# Patient Record
Sex: FEMALE | Race: WHITE | NOT HISPANIC OR LATINO | Employment: OTHER | URBAN - METROPOLITAN AREA
[De-identification: names, ages, dates, MRNs, and addresses within clinical notes are randomized per-mention and may not be internally consistent; named-entity substitution may affect disease eponyms.]

---

## 2017-03-20 PROBLEM — R79.89 ELEVATED TROPONIN: Status: ACTIVE | Noted: 2017-03-20

## 2017-03-20 PROBLEM — N39.0 UTI (URINARY TRACT INFECTION): Status: ACTIVE | Noted: 2017-03-20

## 2017-03-20 PROBLEM — G93.41 ACUTE METABOLIC ENCEPHALOPATHY: Status: ACTIVE | Noted: 2017-03-20

## 2017-03-20 PROBLEM — D72.829 LEUKOCYTOSIS: Status: ACTIVE | Noted: 2017-03-20

## 2017-03-21 PROBLEM — D72.829 LEUKOCYTOSIS: Status: RESOLVED | Noted: 2017-03-20 | Resolved: 2017-03-21

## 2017-03-21 PROBLEM — I50.32 CHRONIC DIASTOLIC HEART FAILURE: Status: ACTIVE | Noted: 2017-03-21

## 2017-03-21 PROBLEM — J81.1 PULMONARY EDEMA: Status: ACTIVE | Noted: 2017-03-21

## 2017-05-29 PROBLEM — N39.0 UTI (URINARY TRACT INFECTION): Status: RESOLVED | Noted: 2017-03-20 | Resolved: 2017-05-29

## 2017-05-29 PROBLEM — E87.6 HYPOKALEMIA: Status: ACTIVE | Noted: 2017-05-29

## 2017-05-29 PROBLEM — R79.89 ELEVATED TROPONIN: Status: RESOLVED | Noted: 2017-03-20 | Resolved: 2017-05-29

## 2017-05-29 PROBLEM — R41.82 ALTERED MENTAL STATUS: Status: ACTIVE | Noted: 2017-05-29

## 2017-05-29 PROBLEM — J81.1 PULMONARY EDEMA: Status: RESOLVED | Noted: 2017-03-21 | Resolved: 2017-05-29

## 2017-05-29 PROBLEM — R34 DECREASED URINE OUTPUT: Status: ACTIVE | Noted: 2017-05-29

## 2017-05-30 PROBLEM — N28.9 RENAL LESION: Status: ACTIVE | Noted: 2017-05-30

## 2017-05-31 ENCOUNTER — OUTPATIENT CASE MANAGEMENT (OUTPATIENT)
Dept: ADMINISTRATIVE | Facility: OTHER | Age: 77
End: 2017-05-31

## 2017-05-31 NOTE — PROGRESS NOTES
Please note that this patient was not enrolled in Outpatient Case Management at this time due to being transferred to a facility.     Please contact hospitals at Ext. [phone] with questions.    Thank you,      Merly Gautam, SSC

## 2017-11-07 ENCOUNTER — LAB VISIT (OUTPATIENT)
Dept: LAB | Facility: HOSPITAL | Age: 77
End: 2017-11-07
Attending: NURSE PRACTITIONER
Payer: MEDICARE

## 2017-11-07 ENCOUNTER — OFFICE VISIT (OUTPATIENT)
Dept: UROLOGY | Facility: CLINIC | Age: 77
End: 2017-11-07
Payer: MEDICARE

## 2017-11-07 VITALS — SYSTOLIC BLOOD PRESSURE: 142 MMHG | HEART RATE: 82 BPM | DIASTOLIC BLOOD PRESSURE: 80 MMHG

## 2017-11-07 DIAGNOSIS — R30.0 DYSURIA: ICD-10-CM

## 2017-11-07 DIAGNOSIS — N39.0 FREQUENT UTI: ICD-10-CM

## 2017-11-07 DIAGNOSIS — N39.0 FREQUENT UTI: Primary | ICD-10-CM

## 2017-11-07 PROCEDURE — 99204 OFFICE O/P NEW MOD 45 MIN: CPT | Mod: S$PBB,,, | Performed by: NURSE PRACTITIONER

## 2017-11-07 PROCEDURE — 87077 CULTURE AEROBIC IDENTIFY: CPT

## 2017-11-07 PROCEDURE — 87088 URINE BACTERIA CULTURE: CPT

## 2017-11-07 PROCEDURE — 99999 PR PBB SHADOW E&M-EST. PATIENT-LVL IV: CPT | Mod: PBBFAC,,, | Performed by: NURSE PRACTITIONER

## 2017-11-07 PROCEDURE — 87086 URINE CULTURE/COLONY COUNT: CPT

## 2017-11-07 PROCEDURE — 87186 SC STD MICRODIL/AGAR DIL: CPT

## 2017-11-07 PROCEDURE — 99214 OFFICE O/P EST MOD 30 MIN: CPT | Mod: PBBFAC,PN | Performed by: NURSE PRACTITIONER

## 2017-11-07 RX ORDER — PHENAZOPYRIDINE HYDROCHLORIDE 100 MG/1
100 TABLET, FILM COATED ORAL 3 TIMES DAILY PRN
Qty: 15 TABLET | Refills: 0 | Status: SHIPPED | OUTPATIENT
Start: 2017-11-07 | End: 2017-11-12

## 2017-11-07 NOTE — PROGRESS NOTES
Subjective:       Patient ID: Laura Negron is a 77 y.o. female.    Chief Complaint: Urinary Tract Infection    Patient from [facility]. Recent UTI. Started on Cipro 500 mg BID x7 days. UA (10/23/17) and urine culture (10/26/17) was performed at an external lab. Patient reports holding her urine a lot at the nursing home while waiting for assistance to the restroom. Patient wears depends for accidents. Patient has had 3 positive urine culture results in the past 8 months (2 different pathogens noted). After most recent treatment with antibiotic (cipro), patient is still having urinary symptoms after completing the course. Patient caregiver from [facility] is present during visit.       Urinary Tract Infection    This is a recurrent problem. The current episode started acute onset. The problem occurs every urination. The problem has been unchanged. The quality of the pain is described as burning. The pain is at a severity of 3/10. The pain is mild. There has been no fever. She is not sexually active. There is no history of pyelonephritis. Associated symptoms include frequency and urgency. Pertinent negatives include no behavior changes, chills, discharge, flank pain, hematuria, nausea, vomiting or bubble bath use. She has tried antibiotics (Cipro) for the symptoms. Her past medical history is significant for diabetes mellitus, hypertension and recurrent UTIs. There is no history of kidney stones.     Review of Systems   Constitutional: Negative for chills.   Respiratory: Negative.    Cardiovascular: Negative.    Gastrointestinal: Negative for nausea and vomiting.   Genitourinary: Positive for dysuria, frequency and urgency. Negative for difficulty urinating, flank pain and hematuria.       Objective:      Physical Exam   Constitutional: She is oriented to person, place, and time. She appears well-developed and well-nourished.   HENT:   Head: Normocephalic and atraumatic.   Eyes: EOM are  normal. Pupils are equal, round, and reactive to light.   Neck: Normal range of motion.   Cardiovascular: Normal rate.    Pulmonary/Chest: Effort normal. No respiratory distress.   Patient in home O2 2.5 liters NC.    Abdominal: Soft. She exhibits no distension. There is no tenderness.   Musculoskeletal: Normal range of motion.   Currently in wheelchair.   Neurological: She is alert and oriented to person, place, and time. Coordination normal.   Skin: Skin is warm and dry.   Psychiatric: She has a normal mood and affect. Her behavior is normal. Judgment and thought content normal.   Nursing note and vitals reviewed.      Assessment:       1. Frequent UTI    2. Dysuria        Plan:       Laura was seen today for urinary tract infection.    Diagnoses and all orders for this visit:    Frequent UTI  -     POCT URINE DIPSTICK WITHOUT MICROSCOPE  -     Urine culture; Future    Dysuria  -     POCT URINE DIPSTICK WITHOUT MICROSCOPE  -     Urine culture; Future  -     phenazopyridine (PYRIDIUM) 100 MG tablet; Take 1 tablet (100 mg total) by mouth 3 (three) times daily as needed for Pain.    Other orders  Urine recheck (U/A with C + S) today post-antibiotic regimen.     Follow-up if symptoms worsen or fail to improve.     Kathryn Suarez NP

## 2017-11-10 DIAGNOSIS — N39.0 URINARY TRACT INFECTION WITHOUT HEMATURIA, SITE UNSPECIFIED: Primary | ICD-10-CM

## 2017-11-10 LAB — BACTERIA UR CULT: NORMAL

## 2017-11-10 RX ORDER — NITROFURANTOIN 25; 75 MG/1; MG/1
100 CAPSULE ORAL 2 TIMES DAILY
Qty: 14 CAPSULE | Refills: 0 | Status: SHIPPED | OUTPATIENT
Start: 2017-11-10 | End: 2017-11-17

## 2017-11-10 NOTE — PROGRESS NOTES
Spoke with patient nurse (Ernestina) at [facility] regarding urine culture result and sensitivity report. Verbal order given for Macrobid 100 mg by mouth every 12 hours for 7 days; give with food.     Diagnoses and all orders for this visit:    Urinary tract infection without hematuria, site unspecified  -     nitrofurantoin, macrocrystal-monohydrate, (MACROBID) 100 MG capsule; Take 1 capsule (100 mg total) by mouth 2 (two) times daily.    Follow-up if symptoms worsen or fail to improve.     Kathryn Suarez, NP

## 2017-11-21 DIAGNOSIS — N28.89 RENAL MASS, LEFT: Primary | ICD-10-CM

## 2018-03-17 ENCOUNTER — HOSPITAL ENCOUNTER (INPATIENT)
Facility: HOSPITAL | Age: 78
LOS: 2 days | Discharge: NURSING, REHAB OR LONG-TERM CARE | DRG: 444 | End: 2018-03-19
Attending: HOSPITALIST | Admitting: HOSPITALIST
Payer: MEDICARE

## 2018-03-17 DIAGNOSIS — K81.0 ACUTE CHOLECYSTITIS: ICD-10-CM

## 2018-03-17 PROCEDURE — 11000001 HC ACUTE MED/SURG PRIVATE ROOM

## 2018-03-17 PROCEDURE — 25000003 PHARM REV CODE 250: Performed by: NURSE PRACTITIONER

## 2018-03-17 RX ORDER — SODIUM CHLORIDE 0.9 % (FLUSH) 0.9 %
5 SYRINGE (ML) INJECTION
Status: DISCONTINUED | OUTPATIENT
Start: 2018-03-17 | End: 2018-03-19 | Stop reason: HOSPADM

## 2018-03-17 RX ORDER — GLUCAGON 1 MG
1 KIT INJECTION
Status: DISCONTINUED | OUTPATIENT
Start: 2018-03-17 | End: 2018-03-19 | Stop reason: HOSPADM

## 2018-03-17 RX ORDER — ONDANSETRON 8 MG/1
8 TABLET, ORALLY DISINTEGRATING ORAL EVERY 8 HOURS PRN
Status: DISCONTINUED | OUTPATIENT
Start: 2018-03-17 | End: 2018-03-19 | Stop reason: HOSPADM

## 2018-03-17 RX ORDER — IBUPROFEN 200 MG
24 TABLET ORAL
Status: DISCONTINUED | OUTPATIENT
Start: 2018-03-17 | End: 2018-03-19 | Stop reason: HOSPADM

## 2018-03-17 RX ORDER — AMOXICILLIN 250 MG
1 CAPSULE ORAL 2 TIMES DAILY PRN
Status: DISCONTINUED | OUTPATIENT
Start: 2018-03-17 | End: 2018-03-19 | Stop reason: HOSPADM

## 2018-03-17 RX ORDER — ACETAMINOPHEN 325 MG/1
650 TABLET ORAL EVERY 4 HOURS PRN
Status: DISCONTINUED | OUTPATIENT
Start: 2018-03-17 | End: 2018-03-19 | Stop reason: HOSPADM

## 2018-03-17 RX ORDER — METRONIDAZOLE 500 MG/100ML
500 INJECTION, SOLUTION INTRAVENOUS
Status: DISCONTINUED | OUTPATIENT
Start: 2018-03-18 | End: 2018-03-19 | Stop reason: HOSPADM

## 2018-03-17 RX ORDER — IBUPROFEN 200 MG
16 TABLET ORAL
Status: DISCONTINUED | OUTPATIENT
Start: 2018-03-17 | End: 2018-03-19 | Stop reason: HOSPADM

## 2018-03-17 RX ORDER — IPRATROPIUM BROMIDE AND ALBUTEROL SULFATE 2.5; .5 MG/3ML; MG/3ML
3 SOLUTION RESPIRATORY (INHALATION) EVERY 4 HOURS PRN
Status: DISCONTINUED | OUTPATIENT
Start: 2018-03-17 | End: 2018-03-19 | Stop reason: HOSPADM

## 2018-03-17 RX ORDER — RAMELTEON 8 MG/1
8 TABLET ORAL NIGHTLY PRN
Status: DISCONTINUED | OUTPATIENT
Start: 2018-03-17 | End: 2018-03-19 | Stop reason: HOSPADM

## 2018-03-17 RX ORDER — POLYETHYLENE GLYCOL 3350 17 G/17G
17 POWDER, FOR SOLUTION ORAL DAILY
Status: DISCONTINUED | OUTPATIENT
Start: 2018-03-18 | End: 2018-03-19 | Stop reason: HOSPADM

## 2018-03-17 RX ORDER — PROCHLORPERAZINE EDISYLATE 5 MG/ML
5 INJECTION INTRAMUSCULAR; INTRAVENOUS EVERY 6 HOURS PRN
Status: DISCONTINUED | OUTPATIENT
Start: 2018-03-17 | End: 2018-03-19 | Stop reason: HOSPADM

## 2018-03-17 RX ORDER — SODIUM CHLORIDE 9 MG/ML
INJECTION, SOLUTION INTRAVENOUS CONTINUOUS
Status: ACTIVE | OUTPATIENT
Start: 2018-03-17 | End: 2018-03-18

## 2018-03-17 RX ORDER — INSULIN ASPART 100 [IU]/ML
0-5 INJECTION, SOLUTION INTRAVENOUS; SUBCUTANEOUS
Status: DISCONTINUED | OUTPATIENT
Start: 2018-03-17 | End: 2018-03-19 | Stop reason: HOSPADM

## 2018-03-17 RX ORDER — BISACODYL 10 MG
10 SUPPOSITORY, RECTAL RECTAL DAILY PRN
Status: DISCONTINUED | OUTPATIENT
Start: 2018-03-17 | End: 2018-03-19 | Stop reason: HOSPADM

## 2018-03-17 RX ADMIN — SODIUM CHLORIDE: 0.9 INJECTION, SOLUTION INTRAVENOUS at 10:03

## 2018-03-18 PROBLEM — N30.00 ACUTE CYSTITIS: Status: ACTIVE | Noted: 2018-03-18

## 2018-03-18 PROBLEM — Z99.81 ON HOME OXYGEN THERAPY: Status: ACTIVE | Noted: 2018-03-18

## 2018-03-18 PROBLEM — I50.32 CHRONIC DIASTOLIC HEART FAILURE: Chronic | Status: ACTIVE | Noted: 2017-03-21

## 2018-03-18 PROBLEM — Z99.81 ON HOME OXYGEN THERAPY: Chronic | Status: ACTIVE | Noted: 2018-03-18

## 2018-03-18 LAB
ALBUMIN SERPL BCP-MCNC: 2.4 G/DL
ALP SERPL-CCNC: 97 U/L
ALT SERPL W/O P-5'-P-CCNC: 10 U/L
ANION GAP SERPL CALC-SCNC: 11 MMOL/L
AST SERPL-CCNC: 19 U/L
BASOPHILS # BLD AUTO: 0.02 K/UL
BASOPHILS NFR BLD: 0.2 %
BILIRUB SERPL-MCNC: 0.5 MG/DL
BUN SERPL-MCNC: 33 MG/DL
CALCIUM SERPL-MCNC: 8.5 MG/DL
CHLORIDE SERPL-SCNC: 105 MMOL/L
CO2 SERPL-SCNC: 26 MMOL/L
CREAT SERPL-MCNC: 0.7 MG/DL
DIFFERENTIAL METHOD: ABNORMAL
EOSINOPHIL # BLD AUTO: 0.3 K/UL
EOSINOPHIL NFR BLD: 2.1 %
ERYTHROCYTE [DISTWIDTH] IN BLOOD BY AUTOMATED COUNT: 14.8 %
EST. GFR  (AFRICAN AMERICAN): >60 ML/MIN/1.73 M^2
EST. GFR  (NON AFRICAN AMERICAN): >60 ML/MIN/1.73 M^2
GLUCOSE SERPL-MCNC: 102 MG/DL
HCT VFR BLD AUTO: 35.7 %
HGB BLD-MCNC: 11 G/DL
LYMPHOCYTES # BLD AUTO: 0.6 K/UL
LYMPHOCYTES NFR BLD: 4.8 %
MAGNESIUM SERPL-MCNC: 2.1 MG/DL
MCH RBC QN AUTO: 28.1 PG
MCHC RBC AUTO-ENTMCNC: 30.8 G/DL
MCV RBC AUTO: 91 FL
MONOCYTES # BLD AUTO: 0.6 K/UL
MONOCYTES NFR BLD: 5.1 %
NEUTROPHILS # BLD AUTO: 11.1 K/UL
NEUTROPHILS NFR BLD: 87.8 %
PHOSPHATE SERPL-MCNC: 3 MG/DL
PLATELET # BLD AUTO: 186 K/UL
PMV BLD AUTO: 11.3 FL
POCT GLUCOSE: 113 MG/DL (ref 70–110)
POCT GLUCOSE: 88 MG/DL (ref 70–110)
POTASSIUM SERPL-SCNC: 4.1 MMOL/L
PROT SERPL-MCNC: 5.8 G/DL
RBC # BLD AUTO: 3.91 M/UL
SODIUM SERPL-SCNC: 142 MMOL/L
TSH SERPL DL<=0.005 MIU/L-ACNC: 2.07 UIU/ML
WBC # BLD AUTO: 12.62 K/UL

## 2018-03-18 PROCEDURE — 94640 AIRWAY INHALATION TREATMENT: CPT

## 2018-03-18 PROCEDURE — 84443 ASSAY THYROID STIM HORMONE: CPT

## 2018-03-18 PROCEDURE — 99900035 HC TECH TIME PER 15 MIN (STAT)

## 2018-03-18 PROCEDURE — 99222 1ST HOSP IP/OBS MODERATE 55: CPT | Mod: ,,, | Performed by: STUDENT IN AN ORGANIZED HEALTH CARE EDUCATION/TRAINING PROGRAM

## 2018-03-18 PROCEDURE — 11000001 HC ACUTE MED/SURG PRIVATE ROOM

## 2018-03-18 PROCEDURE — 36415 COLL VENOUS BLD VENIPUNCTURE: CPT

## 2018-03-18 PROCEDURE — 25000242 PHARM REV CODE 250 ALT 637 W/ HCPCS: Performed by: NURSE PRACTITIONER

## 2018-03-18 PROCEDURE — 94761 N-INVAS EAR/PLS OXIMETRY MLT: CPT

## 2018-03-18 PROCEDURE — 85025 COMPLETE CBC W/AUTO DIFF WBC: CPT

## 2018-03-18 PROCEDURE — 80053 COMPREHEN METABOLIC PANEL: CPT

## 2018-03-18 PROCEDURE — 83735 ASSAY OF MAGNESIUM: CPT

## 2018-03-18 PROCEDURE — S0030 INJECTION, METRONIDAZOLE: HCPCS | Performed by: NURSE PRACTITIONER

## 2018-03-18 PROCEDURE — 84100 ASSAY OF PHOSPHORUS: CPT

## 2018-03-18 PROCEDURE — 63600175 PHARM REV CODE 636 W HCPCS: Performed by: NURSE PRACTITIONER

## 2018-03-18 PROCEDURE — 25000003 PHARM REV CODE 250: Performed by: NURSE PRACTITIONER

## 2018-03-18 RX ORDER — ASPIRIN 81 MG/1
81 TABLET ORAL DAILY
Status: DISCONTINUED | OUTPATIENT
Start: 2018-03-18 | End: 2018-03-19 | Stop reason: HOSPADM

## 2018-03-18 RX ORDER — VALSARTAN 160 MG/1
160 TABLET ORAL DAILY
Status: DISCONTINUED | OUTPATIENT
Start: 2018-03-18 | End: 2018-03-19 | Stop reason: HOSPADM

## 2018-03-18 RX ORDER — MECLIZINE HYDROCHLORIDE 25 MG/1
25 TABLET ORAL 3 TIMES DAILY PRN
Status: DISCONTINUED | OUTPATIENT
Start: 2018-03-18 | End: 2018-03-19 | Stop reason: HOSPADM

## 2018-03-18 RX ORDER — TIOTROPIUM BROMIDE 18 UG/1
18 CAPSULE ORAL; RESPIRATORY (INHALATION) DAILY
Status: DISCONTINUED | OUTPATIENT
Start: 2018-03-18 | End: 2018-03-19 | Stop reason: HOSPADM

## 2018-03-18 RX ORDER — CETIRIZINE HYDROCHLORIDE 5 MG/1
10 TABLET ORAL DAILY
Status: DISCONTINUED | OUTPATIENT
Start: 2018-03-18 | End: 2018-03-19 | Stop reason: HOSPADM

## 2018-03-18 RX ORDER — ESCITALOPRAM OXALATE 20 MG/1
20 TABLET ORAL DAILY
Status: DISCONTINUED | OUTPATIENT
Start: 2018-03-18 | End: 2018-03-19 | Stop reason: HOSPADM

## 2018-03-18 RX ORDER — FLUTICASONE FUROATE AND VILANTEROL 100; 25 UG/1; UG/1
1 POWDER RESPIRATORY (INHALATION) DAILY
Status: DISCONTINUED | OUTPATIENT
Start: 2018-03-18 | End: 2018-03-19 | Stop reason: HOSPADM

## 2018-03-18 RX ORDER — METOPROLOL SUCCINATE 25 MG/1
25 TABLET, EXTENDED RELEASE ORAL NIGHTLY
Status: DISCONTINUED | OUTPATIENT
Start: 2018-03-18 | End: 2018-03-19 | Stop reason: HOSPADM

## 2018-03-18 RX ORDER — CLONAZEPAM 0.5 MG/1
0.5 TABLET ORAL 2 TIMES DAILY PRN
Status: DISCONTINUED | OUTPATIENT
Start: 2018-03-18 | End: 2018-03-19 | Stop reason: HOSPADM

## 2018-03-18 RX ORDER — DOCUSATE SODIUM 100 MG/1
100 CAPSULE, LIQUID FILLED ORAL 2 TIMES DAILY
Status: DISCONTINUED | OUTPATIENT
Start: 2018-03-18 | End: 2018-03-19 | Stop reason: HOSPADM

## 2018-03-18 RX ADMIN — CEFTRIAXONE SODIUM 1 G: 1 INJECTION, POWDER, FOR SOLUTION INTRAMUSCULAR; INTRAVENOUS at 05:03

## 2018-03-18 RX ADMIN — SODIUM CHLORIDE: 0.9 INJECTION, SOLUTION INTRAVENOUS at 01:03

## 2018-03-18 RX ADMIN — METRONIDAZOLE 500 MG: 500 INJECTION, SOLUTION INTRAVENOUS at 01:03

## 2018-03-18 RX ADMIN — FLUTICASONE FUROATE AND VILANTEROL TRIFENATATE 1 PUFF: 100; 25 POWDER RESPIRATORY (INHALATION) at 09:03

## 2018-03-18 RX ADMIN — METRONIDAZOLE 500 MG: 500 INJECTION, SOLUTION INTRAVENOUS at 04:03

## 2018-03-18 RX ADMIN — ESCITALOPRAM OXALATE 20 MG: 20 TABLET, FILM COATED ORAL at 09:03

## 2018-03-18 RX ADMIN — METOPROLOL SUCCINATE 25 MG: 25 TABLET, EXTENDED RELEASE ORAL at 08:03

## 2018-03-18 RX ADMIN — VALSARTAN 160 MG: 160 TABLET, FILM COATED ORAL at 09:03

## 2018-03-18 RX ADMIN — TIOTROPIUM BROMIDE 18 MCG: 18 CAPSULE ORAL; RESPIRATORY (INHALATION) at 09:03

## 2018-03-18 RX ADMIN — METRONIDAZOLE 500 MG: 500 INJECTION, SOLUTION INTRAVENOUS at 08:03

## 2018-03-18 RX ADMIN — CETIRIZINE HYDROCHLORIDE 10 MG: 5 TABLET, FILM COATED ORAL at 09:03

## 2018-03-18 NOTE — HPI
"Laura Negron is a 77-year-old female with a past medical history of anemia, anxiety, asthma, constipation, COPD on home oxygen 2.5 lpm, dementia, Type 2 Diabetes, hypertension, depression, and thrombocytopenia who presented to [facility] ED via EMS from [facility] for altered mental status. she has been vomiting once a day for the past 5 days and had some recent constipation with last bowel movement earlier this morning She was afebrile, non-toxic appearing, and hemodynamically stable. At the nursing home she had been "acting funny" since earlier this morning and her level of consciousness gradually deteriorated from there. Head CT is negative. He mental status improved while in the ED and she is now alert and oriented.  She is on day 2 or 3 of oral ciprofloxacin for a urinary tract infection. She had Mild rhonchi noted bilaterally on auscultation of lung fields. Chest X-Ray is concerning for concern for mild bilateral pulmonary interstitial and perihilar opacities, as well as mild bibasilar atelectasis.  ED workup revealed labs which are notable for mild increase to WBC (12.73), stable anemia, and an increased BUN of 42 (up from 19) and a normal creatinine. Lactic Acid is normal. Urinalysis does show urinary tract infection. Urine drug screen is negative. Her abdomen was tender to palpation in her right lower quadrant abdominal CT was done which showed  acute cholecystitis with a stone in the neck of the gallbladder without ductal dilation.  She has normal bilirubin, normal liver enzymes. Dr. Jay with surgery was consulted who covers here and at [facility].  He requested transfer to [facility] for IR consultation for cholecystostomy  drain. She is admitted to [facility] Medicine. She was treated with 1liter of normal saline, ceftriaxone and metronidazole on the ED.       "

## 2018-03-18 NOTE — CONSULTS
Patient ID: Laura Negron is a 77 y.o. female.    Chief Complaint: Altered Mental Status      HPI:  77F transferred here from [facility] were she was admitted from nursing home for decreased mental status, concern for sepsis, cholecystitis. She was found to be mostly unresponsive yesterday. Complained of a few days of RUQ pain as well as nausea, vomiting. She is alert now and reports having this pain on and off for about the past 3 months. She has been afebrile since admit here. WBC has normalized and she is currently without pain at all.        Review of Systems   Constitutional: Positive for fever.   HENT: Negative for trouble swallowing.    Respiratory: Negative for shortness of breath.    Cardiovascular: Negative for chest pain.   Gastrointestinal: Positive for abdominal pain and nausea. Negative for blood in stool.   Genitourinary: Positive for dysuria.   Musculoskeletal: Positive for gait problem.   Skin: Negative for rash and wound.   Allergic/Immunologic: Negative for immunocompromised state.   Neurological: Positive for weakness.   Hematological: Negative for adenopathy. Does not bruise/bleed easily.   Psychiatric/Behavioral: Negative for agitation.       Current Facility-Administered Medications   Medication Dose Route Frequency Provider Last Rate Last Dose    0.9%  NaCl infusion   Intravenous Continuous Winnie Sánchez NP 75 mL/hr at 03/17/18 2215      acetaminophen tablet 650 mg  650 mg Oral Q4H PRN Winnie Sánchez NP        albuterol-ipratropium 2.5mg-0.5mg/3mL nebulizer solution 3 mL  3 mL Nebulization Q4H PRN Winnie Sánchez NP        aspirin EC tablet 81 mg  81 mg Oral Daily Winnie Sánchez NP        bisacodyl suppository 10 mg  10 mg Rectal Daily PRN Winnie Sánchez NP        cefTRIAXone (ROCEPHIN) 1 g in dextrose 5 % 50 mL IVPB  1 g Intravenous Q24H Winnie Sánchez NP        cetirizine tablet 10 mg  10 mg Oral Daily Winnie Sánchez NP   10 mg at  03/18/18 0901    clonazePAM tablet 0.5 mg  0.5 mg Oral BID PRN Winnie Sánchez, NP        dextrose 50% injection 12.5 g  12.5 g Intravenous PRN Winnie Sánchez, NP        dextrose 50% injection 25 g  25 g Intravenous PRN Winnie Sánchez, NP        docusate sodium capsule 100 mg  100 mg Oral BID Winnie Sánchez, LETITIA        escitalopram oxalate tablet 20 mg  20 mg Oral Daily Winnie Sánchez, NP   20 mg at 03/18/18 0901    fluticasone-vilanterol 100-25 mcg/dose diskus inhaler 1 puff  1 puff Inhalation Daily Winnie Sánchez NP   1 puff at 03/18/18 0920    glucagon (human recombinant) injection 1 mg  1 mg Intramuscular PRN Winnie Sánchez, NP        glucose chewable tablet 16 g  16 g Oral PRN Winnie Sánchez, NP        glucose chewable tablet 24 g  24 g Oral PRN Winnie Sánchez, NP        insulin aspart U-100 pen 0-5 Units  0-5 Units Subcutaneous QID (AC + HS) PRN Winnie Sánchez, LETITIA        meclizine tablet 25 mg  25 mg Oral TID PRN Winnie Sánchez, LETITIA        metoprolol succinate (TOPROL-XL) 24 hr tablet 25 mg  25 mg Oral QHS Winnie Sánchez, LETITIA        metronidazole IVPB 500 mg  500 mg Intravenous Q8H Winnie Sánchez,  mL/hr at 03/18/18 0444 500 mg at 03/18/18 0444    ondansetron disintegrating tablet 8 mg  8 mg Oral Q8H PRN Winnie Sánchez, LETITIA        polyethylene glycol packet 17 g  17 g Oral Daily Winnie Sánchez, LETITIA        prochlorperazine injection Soln 5 mg  5 mg Intravenous Q6H PRN Winnie Sánchez, NP        ramelteon tablet 8 mg  8 mg Oral Nightly PRN Winnie Sánchez, LETITIA        senna-docusate 8.6-50 mg per tablet 1 tablet  1 tablet Oral BID PRN Winnie Sánchez, NP        sodium chloride 0.9% flush 5 mL  5 mL Intravenous PRN Winine Sánchez NP        tiotropium inhalation capsule 18 mcg  18 mcg Inhalation Daily Winnie Sánchez NP   18 mcg at 03/18/18 0917    valsartan tablet 160 mg  160 mg Oral Daily Winnie  JULIANA Sánchez NP   160 mg at 03/18/18 0901    white petrolatum-mineral oil (LUBIFRESH P.M.) ophthalmic ointment   Both Eyes Nightly SHABBIRN Winnie Sánchez NP           Review of patient's allergies indicates:  No Known Allergies    Past Medical History:   Diagnosis Date    Anemia     Anxiety     Asthma     Constipation     COPD (chronic obstructive pulmonary disease)     Dementia     Dependence on supplemental oxygen     Diabetes mellitus type 2 in obese     Difficulty in walking     Edema     Hypertension     Major depression, single episode     Risk for falls     Thrombocytopenia        No past surgical history on file.    No family history on file.    Social History     Social History    Marital status: Single     Spouse name: N/A    Number of children: N/A    Years of education: N/A     Occupational History    Not on file.     Social History Main Topics    Smoking status: Former Smoker     Packs/day: 1.00     Types: Cigarettes     Quit date: 7/5/2012    Smokeless tobacco: Not on file    Alcohol use No    Drug use: No    Sexual activity: No     Other Topics Concern    Not on file     Social History Narrative    No narrative on file       Vitals:    03/18/18 0952   BP:    Pulse: 87   Resp:    Temp:        Physical Exam   Constitutional: She is oriented to person, place, and time. She appears well-nourished. No distress.   HENT:   Head: Normocephalic and atraumatic.   Cardiovascular: Normal rate.    Pulmonary/Chest: Effort normal. No stridor.   Abdominal: Soft. She exhibits no mass. There is no tenderness. There is no rebound and no guarding.   Neurological: She is alert and oriented to person, place, and time.   Skin: Skin is warm. She is not diaphoretic. No erythema.     WBC 12  Hg 11  Plts 186  LE+ on UA  Tbili 0.6  CT shows gallstones, inflammation around gallbladder    Assessment & Plan:   77F with COPD, nursing home resident here for acute cholecystitis, resolving  Patient is not a  good candidate for cholecystectomy and is otherwise not consenting to surgery anyway. We discussed cholecystostomy tube placement but she is currently asymptomatic and a more conservative approach with abx only seems reasonable.

## 2018-03-18 NOTE — NURSING
Patient had not urinated for 6 hours. Bladder scan performed. 33mls shown. Pt then stated that she just started to pee. Patient cleaned and provided with incontinent brief.

## 2018-03-18 NOTE — PLAN OF CARE
Problem: Patient Care Overview  Goal: Plan of Care Review  Outcome: Ongoing (interventions implemented as appropriate)  Patient on oxygen with documented flow of 2lpm.  Will attempt to wean per O2 order protocol. Will continue to monitor.

## 2018-03-18 NOTE — PLAN OF CARE
Problem: Patient Care Overview  Goal: Plan of Care Review  Pt is confused at times but alert and oriented to person and place. Reorientation to time attempted. Pt continued to be confused about time. Pt started on IV fluids and IV antibiotics. NPO for expected IR placement for Cholecystotomy Drain. Telemetry and blood sugar monitored. Safety maintained. Lighting adjusted, bed alarm active, bed rails up x3. Pt encouraged to use call light to voice any needs. Will continue to monitor for any changes.

## 2018-03-18 NOTE — HOSPITAL COURSE
Pt receiving ceftriaxone and flagyl. Initial plan was for cholecystostomy drain; however, d/t the pt's comorbidities and refusal of surgery conservative treatment with metronidazole and ciprofloxacin was pursued. She was d/bruna with abx.

## 2018-03-18 NOTE — SUBJECTIVE & OBJECTIVE
Past Medical History:   Diagnosis Date    Anemia     Anxiety     Asthma     Constipation     COPD (chronic obstructive pulmonary disease)     Dementia     Dependence on supplemental oxygen     Diabetes mellitus type 2 in obese     Difficulty in walking     Edema     Hypertension     Major depression, single episode     Risk for falls     Thrombocytopenia        No past surgical history on file.    Review of patient's allergies indicates:  No Known Allergies    Current Facility-Administered Medications on File Prior to Encounter   Medication    cefTRIAXone injection 1 g    metronidazole IVPB 500 mg    [COMPLETED] sodium chloride 0.9% bolus 1,000 mL     Current Outpatient Prescriptions on File Prior to Encounter   Medication Sig    acetaminophen (TYLENOL) 500 MG tablet Take 500 mg by mouth every 4 (four) hours as needed for Pain.     albuterol-ipratropium 2.5mg-0.5mg/3mL (DUO-NEB) 0.5 mg-3 mg(2.5 mg base)/3 mL nebulizer solution Take 3 mLs by nebulization every 6 (six) hours as needed for Wheezing.    aspirin (ECOTRIN) 81 MG EC tablet Take 81 mg by mouth once daily.    ciprofloxacin HCl (CIPRO) 500 MG tablet Take 500 mg by mouth 2 (two) times daily.    clonazePAM (KLONOPIN) 0.5 MG tablet Take 1 tablet (0.5 mg total) by mouth 2 (two) times daily as needed (anxiety).    dextran 70-hypromellose (TEARS) ophthalmic solution Place 2 drops into both eyes 2 (two) times daily.    docusate sodium (COLACE) 100 MG capsule Take 1 capsule (100 mg total) by mouth 2 (two) times daily.    escitalopram oxalate (LEXAPRO) 20 MG tablet Take 20 mg by mouth once daily.    fluticasone-salmeterol 250-50 mcg/dose (ADVAIR) 250-50 mcg/dose diskus inhaler Inhale 1 puff into the lungs 2 (two) times daily.    furosemide (LASIX) 20 MG tablet Take 1 tablet (20 mg total) by mouth once daily.    insulin aspart (NOVOLOG) 100 unit/mL InPn pen Inject 0-5 Units into the skin before meals and at bedtime as needed (Hyperglycemia).     loratadine (CLARITIN) 10 mg tablet Take 10 mg by mouth once daily.    meclizine (ANTIVERT) 32 MG tablet Take 25 mg by mouth 3 (three) times daily as needed.    melatonin 3 mg TbSR Take 2 tablets by mouth every evening.    metoprolol succinate (TOPROL-XL) 25 MG 24 hr tablet Take 25 mg by mouth every evening.     polyethylene glycol (GLYCOLAX) 17 gram PwPk Take 17 g by mouth once daily at 6am.     potassium chloride (KLOR-CON) 20 mEq Pack Take 20 mEq by mouth once daily.    tiotropium (SPIRIVA) 18 mcg inhalation capsule Inhale 18 mcg into the lungs once daily.    valsartan (DIOVAN) 160 MG tablet Take 160 mg by mouth once daily.     Family History     None        Social History Main Topics    Smoking status: Former Smoker     Packs/day: 1.00     Types: Cigarettes     Quit date: 7/5/2012    Smokeless tobacco: Not on file    Alcohol use No    Drug use: No    Sexual activity: No     Review of Systems   Constitutional: Positive for fatigue and fever. Negative for chills and diaphoresis.   HENT: Negative for sore throat and trouble swallowing.    Eyes: Negative for photophobia and visual disturbance.   Respiratory: Positive for shortness of breath. Negative for cough and wheezing.    Cardiovascular: Negative for chest pain and palpitations.   Gastrointestinal: Positive for abdominal pain (Right Lower Quadrant), nausea and vomiting. Negative for constipation and diarrhea.   Endocrine: Negative for polydipsia and polyphagia.   Genitourinary: Negative for decreased urine volume, dysuria, hematuria and urgency.   Musculoskeletal: Negative for joint swelling, neck pain and neck stiffness.   Neurological: Positive for weakness. Negative for numbness and headaches.   Psychiatric/Behavioral: Negative for agitation, dysphoric mood and suicidal ideas.     Objective:     Vital Signs (Most Recent):    Vital Signs (24h Range):  Temp:  [96.7 °F (35.9 °C)] 96.7 °F (35.9 °C)  Pulse:  [78-89] 86  Resp:  [16-24] 19  SpO2:   [90 %-99 %] 98 %  BP: (100-115)/(51-57) 106/52        There is no height or weight on file to calculate BMI.    Physical Exam   Constitutional: She is oriented to person, place, and time. She appears well-developed and well-nourished. No distress. Nasal cannula in place.   HENT:   Head: Normocephalic and atraumatic.   Mouth/Throat: Oropharynx is clear and moist. No oropharyngeal exudate.   Eyes: Conjunctivae are normal. Pupils are equal, round, and reactive to light. No scleral icterus.   Neck: Neck supple.   Cardiovascular: Normal rate, regular rhythm, normal heart sounds and intact distal pulses.  Exam reveals no gallop and no friction rub.    No murmur heard.  Pulmonary/Chest: Effort normal and breath sounds normal. Tachypnea noted. No respiratory distress. She has no wheezes. She has no rales.   Abdominal: Soft. Bowel sounds are normal. She exhibits no distension. There is tenderness (Right Lower Quadrant). There is no rebound and no guarding.   Musculoskeletal: She exhibits no edema, tenderness or deformity.   Neurological: She is alert and oriented to person, place, and time. She exhibits normal muscle tone.   Skin: Skin is warm and dry. Capillary refill takes 2 to 3 seconds. No rash noted. She is not diaphoretic.   Psychiatric: She has a normal mood and affect. Her behavior is normal. Cognition and memory are impaired. She exhibits abnormal recent memory.   Nursing note and vitals reviewed.        CRANIAL NERVES     CN III, IV, VI   Pupils are equal, round, and reactive to light.       Significant Labs:   CBC:   Recent Labs  Lab 03/17/18  1450   WBC 12.73*   HGB 11.4*   HCT 36.2*        CMP:   Recent Labs  Lab 03/17/18  1450      K 4.0   CL 98   CO2 32*   *   BUN 42*   CREATININE 0.79   CALCIUM 8.2*   PROT 6.1   ALBUMIN 3.2*   BILITOT 0.6   ALKPHOS 83   AST 21   ALT 21   ANIONGAP 7*   EGFRNONAA >60.0     Cardiac Markers: No results for input(s): CKMB, MYOGLOBIN, BNP, TROPISTAT in the last  48 hours.  Coagulation:   Recent Labs  Lab 03/17/18  1450   INR 1.3*     Lactic Acid:   Recent Labs  Lab 03/17/18  1450   LACTATE 0.8     Troponin:   Recent Labs  Lab 03/17/18  1450   TROPONINI <0.012     Urine Studies:   Recent Labs  Lab 03/17/18  1441   COLORU Yellow   APPEARANCEUA Cloudy*   PHUR 6.0   SPECGRAV 1.020   PROTEINUA 1+*   GLUCUA Negative   KETONESU Negative   BILIRUBINUA Negative   OCCULTUA Trace*   NITRITE Negative   UROBILINOGEN Negative   LEUKOCYTESUR 2+*   RBCUA 5*   WBCUA >100*   BACTERIA Moderate*   SQUAMEPITHEL 20   HYALINECASTS 0       ECG 3/17/18: Normal sinus rhythm (HR 86), Possible Inferior infarct ,age undetermined, Anterior ST and T wave abnormality    Significant Imaging: I have reviewed all pertinent imaging results/findings within the past 24 hours.     Chest X-Ray 3/17/18 Mild bilateral pulmonary interstitial and perihilar opacities, as well as mild bibasilar atelectasis.    Abdominal CT 3/17/18:    Acute cholecystitis with a stone in the neck of the gallbladder without ductal dilation

## 2018-03-18 NOTE — PROGRESS NOTES
"[facility] Medicine  Progress Note    Patient Name: Laura Negron  MRN: [id number]  Patient Class: IP- Inpatient   Admission Date: 3/17/2018  Length of Stay: 1 days  Attending Physician: Vijay Spring MD  Primary Care Provider: Primary Doctor No        Subjective:     Principal Problem:Acute cholecystitis    HPI:  Laura Negron is a 77-year-old female with a past medical history of anemia, anxiety, asthma, constipation, COPD on home oxygen 2.5 lpm, dementia, Type 2 Diabetes, hypertension, depression, and thrombocytopenia who presented to [facility] ED via EMS from [facility] for altered mental status. she has been vomiting once a day for the past 5 days and had some recent constipation with last bowel movement earlier this morning She was afebrile, non-toxic appearing, and hemodynamically stable. At the nursing home she had been "acting funny" since earlier this morning and her level of consciousness gradually deteriorated from there. Head CT is negative. He mental status improved while in the ED and she is now alert and oriented.  She is on day 2 or 3 of oral ciprofloxacin for a urinary tract infection. She had Mild rhonchi noted bilaterally on auscultation of lung fields. Chest X-Ray is concerning for concern for mild bilateral pulmonary interstitial and perihilar opacities, as well as mild bibasilar atelectasis.  ED workup revealed labs which are notable for mild increase to WBC (12.73), stable anemia, and an increased BUN of 42 (up from 19) and a normal creatinine. Lactic Acid is normal. Urinalysis does show urinary tract infection. Urine drug screen is negative. Her abdomen was tender to palpation in her right lower quadrant abdominal CT was done which showed  acute cholecystitis with a stone in the neck of the gallbladder without ductal dilation.  She has normal bilirubin, normal liver enzymes. Dr. Jay with surgery was consulted who " covers here and at [facility].  He requested transfer to [facility] for IR consultation for cholecystostomy  drain. She is admitted to [facility] Medicine. She was treated with 1liter of normal saline, ceftriaxone and metronidazole on the ED.         Hospital Course:  Pt receiving ceftriaxone and flagyl. To be seen by IR for drain placement.    Interval History: Pt resting in bed. She is feeling well. She is oriented to person, place, and time but does not seem to know why she is in the hospital. She has no pain or SOB today. Would like something to eat. Explained plan of care.    Review of Systems   Respiratory: Positive for wheezing. Negative for cough and shortness of breath.    Cardiovascular: Negative for chest pain, palpitations and leg swelling.   Gastrointestinal: Negative for abdominal pain, diarrhea and nausea.   Genitourinary: Positive for dysuria.     Objective:     Vital Signs (Most Recent):  Temp: 98.2 °F (36.8 °C) (03/18/18 0805)  Pulse: 87 (03/18/18 0952)  Resp: 18 (03/18/18 0920)  BP: (!) 122/58 (03/18/18 0805)  SpO2: 95 % (03/18/18 0920) Vital Signs (24h Range):  Temp:  [96.7 °F (35.9 °C)-98.2 °F (36.8 °C)] 98.2 °F (36.8 °C)  Pulse:  [78-99] 87  Resp:  [16-19] 18  SpO2:  [90 %-99 %] 95 %  BP: (103-122)/(51-58) 122/58     Weight: 98.1 kg (216 lb 4.3 oz)  Body mass index is 34.91 kg/m².    Intake/Output Summary (Last 24 hours) at 03/18/18 0958  Last data filed at 03/18/18 0600   Gross per 24 hour   Intake           681.25 ml   Output                0 ml   Net           681.25 ml      Physical Exam   Constitutional: She is oriented to person, place, and time.   Cardiovascular: Normal rate and regular rhythm.    Pulmonary/Chest: Effort normal. She has wheezes.   Abdominal: Soft. Bowel sounds are normal. There is tenderness (RUQ, mild).   Neurological: She is alert and oriented to person, place, and time.   Rambling and somewhat confused   Psychiatric: She has a normal mood  and affect. Her behavior is normal.       Significant Labs:   CBC:   Recent Labs  Lab 03/17/18  1450 03/18/18  0507   WBC 12.73* 12.62   HGB 11.4* 11.0*   HCT 36.2* 35.7*    186     TSH:   Recent Labs  Lab 03/18/18  0507   TSH 2.074       Significant Imaging: no new    Assessment/Plan:      * Acute cholecystitis    Patient with several days of nausea, vomiting, and right lower quadrant tenderness on palpation and decreased mental status. CT shows cholecystitis with stone on gallbladder neck. No duct dilation. Normal bilirubin, normal liver enzymes, normal wbc, and normal lactic acid.  No complaints today, WBC 12.6, afebrile.  --Surgery, Dr. Castañeda consulted and requesting IR consult for Cholecystotomy Drain  --Ceftriaxone and metronidazole  --NPO  --Gentle IV fluids          Diabetes mellitus type 2 in obese    2/16/18 HgA1c 5.1. .  She is only on sliding scale insulin at nursing home, Check blood glucose AC and HS and use SSI. Diabetic diet, when cleared for Diet..              COPD (chronic obstructive pulmonary disease)    On Home Oxygen (2.5 LPM)  Rare wheezes on exam, no SOB  --Continue home oxygen  --Scheduled Breathing treatments as patient had dyspnea on exam  --Continue home fluticisone-salmeterol and loratadine 10 mg daily          Acute metabolic encephalopathy    Toxic.  Resolving. Avasys at bedside.          Urinary tract infection without hematuria    Urinalysis with WBC > 100, moderate bacterial, 2+ Leukocytes. She was being treated with Ciprofloxacin for a UTI at the nursing home.   Will be covered with Ceftriaxone. Follow Urine Culture        Chronic diastolic heart failure    Essential Hypertension  3/20/17 Echo shows EF 55% with diastolic dysfunction, Mild MVR & TVR, PA 41.64  Stable chest x-ray. Does not appear to be clinically volume overloaded. -122.   --Holding home lasix 20 mg daily. Monitor Closely  --Continue home aspirin 81 mg daily, valsartan 160 mg daily and  metoprolol XL 25 mg daily            Major depression, single episode    Anxiety  Calm and pleasant on exam.   Continue home escitalopram 20 mg daily and clonazepam 0.5 mg BID prn            VTE Risk Mitigation         Ordered     IP VTE LOW RISK PATIENT  Once      03/17/18 4482              Maria Dolores Monet PA-C  Department of Hospital Medicine   [facility]

## 2018-03-18 NOTE — SUBJECTIVE & OBJECTIVE
Interval History: Pt resting in bed. She is feeling well. She is oriented to person, place, and time but does not seem to know why she is in the hospital. She has no pain or SOB today. Would like something to eat. Explained plan of care.    Review of Systems   Respiratory: Positive for wheezing. Negative for cough and shortness of breath.    Cardiovascular: Negative for chest pain, palpitations and leg swelling.   Gastrointestinal: Negative for abdominal pain, diarrhea and nausea.   Genitourinary: Positive for dysuria.     Objective:     Vital Signs (Most Recent):  Temp: 98.2 °F (36.8 °C) (03/18/18 0805)  Pulse: 87 (03/18/18 0952)  Resp: 18 (03/18/18 0920)  BP: (!) 122/58 (03/18/18 0805)  SpO2: 95 % (03/18/18 0920) Vital Signs (24h Range):  Temp:  [96.7 °F (35.9 °C)-98.2 °F (36.8 °C)] 98.2 °F (36.8 °C)  Pulse:  [78-99] 87  Resp:  [16-19] 18  SpO2:  [90 %-99 %] 95 %  BP: (103-122)/(51-58) 122/58     Weight: 98.1 kg (216 lb 4.3 oz)  Body mass index is 34.91 kg/m².    Intake/Output Summary (Last 24 hours) at 03/18/18 0958  Last data filed at 03/18/18 0600   Gross per 24 hour   Intake           681.25 ml   Output                0 ml   Net           681.25 ml      Physical Exam   Constitutional: She is oriented to person, place, and time.   Cardiovascular: Normal rate and regular rhythm.    Pulmonary/Chest: Effort normal. She has wheezes.   Abdominal: Soft. Bowel sounds are normal. There is tenderness (RUQ, mild).   Neurological: She is alert and oriented to person, place, and time.   Rambling and somewhat confused   Psychiatric: She has a normal mood and affect. Her behavior is normal.       Significant Labs:   CBC:   Recent Labs  Lab 03/17/18  1450 03/18/18  0507   WBC 12.73* 12.62   HGB 11.4* 11.0*   HCT 36.2* 35.7*    186     TSH:   Recent Labs  Lab 03/18/18  0507   TSH 2.074       Significant Imaging: no new

## 2018-03-18 NOTE — H&P
"[facility]  History & Physical    Patient Name: Laura Negron  MRN: [id number]  Admission Date: 3/17/2018  Attending Physician: Vijay Spring MD   Primary Care Provider: Primary Doctor No         Patient information was obtained from patient, nursing home, past medical records and ER records.     Subjective:     Principal Problem:Acute cholecystitis    Chief Complaint:   Chief Complaint   Patient presents with    Altered Mental Status        HPI: Laura Negron is a 77-year-old female with a past medical history of anemia, anxiety, asthma, constipation, COPD on home oxygen 2.5 lpm, dementia, Type 2 Diabetes, hypertension, depression, and thrombocytopenia who presented to [facility] ED via EMS from [facility] for altered mental status. she has been vomiting once a day for the past 5 days and had some recent constipation with last bowel movement earlier this morning She was afebrile, non-toxic appearing, and hemodynamically stable. At the nursing home she had been "acting funny" since earlier this morning and her level of consciousness gradually deteriorated from there. Head CT is negative. He mental status improved while in the ED and she is now alert and oriented.  She is on day 2 or 3 of oral ciprofloxacin for a urinary tract infection. She had Mild rhonchi noted bilaterally on auscultation of lung fields. Chest X-Ray is concerning for concern for mild bilateral pulmonary interstitial and perihilar opacities, as well as mild bibasilar atelectasis.  ED workup revealed labs which are notable for mild increase to WBC (12.73), stable anemia, and an increased BUN of 42 (up from 19) and a normal creatinine. Lactic Acid is normal. Urinalysis does show urinary tract infection. Urine drug screen is negative. Her abdomen was tender to palpation in her right lower quadrant abdominal CT was done which showed  acute cholecystitis with a stone in the " neck of the gallbladder without ductal dilation.  She has normal bilirubin, normal liver enzymes. Dr. Jay with surgery was consulted who covers here and at [facility].  He requested transfer to [facility] for IR consultation for cholecystostomy  drain. She is admitted to [facility] Medicine. She was treated with 1liter of normal saline, ceftriaxone and metronidazole on the ED.         Past Medical History:   Diagnosis Date    Anemia     Anxiety     Asthma     Constipation     COPD (chronic obstructive pulmonary disease)     Dementia     Dependence on supplemental oxygen     Diabetes mellitus type 2 in obese     Difficulty in walking     Edema     Hypertension     Major depression, single episode     Risk for falls     Thrombocytopenia        No past surgical history on file.    Review of patient's allergies indicates:  No Known Allergies    Current Facility-Administered Medications on File Prior to Encounter   Medication    cefTRIAXone injection 1 g    metronidazole IVPB 500 mg    [COMPLETED] sodium chloride 0.9% bolus 1,000 mL     Current Outpatient Prescriptions on File Prior to Encounter   Medication Sig    acetaminophen (TYLENOL) 500 MG tablet Take 500 mg by mouth every 4 (four) hours as needed for Pain.     albuterol-ipratropium 2.5mg-0.5mg/3mL (DUO-NEB) 0.5 mg-3 mg(2.5 mg base)/3 mL nebulizer solution Take 3 mLs by nebulization every 6 (six) hours as needed for Wheezing.    aspirin (ECOTRIN) 81 MG EC tablet Take 81 mg by mouth once daily.    ciprofloxacin HCl (CIPRO) 500 MG tablet Take 500 mg by mouth 2 (two) times daily.    clonazePAM (KLONOPIN) 0.5 MG tablet Take 1 tablet (0.5 mg total) by mouth 2 (two) times daily as needed (anxiety).    dextran 70-hypromellose (TEARS) ophthalmic solution Place 2 drops into both eyes 2 (two) times daily.    docusate sodium (COLACE) 100 MG capsule Take 1 capsule (100 mg total) by mouth 2 (two) times daily.     escitalopram oxalate (LEXAPRO) 20 MG tablet Take 20 mg by mouth once daily.    fluticasone-salmeterol 250-50 mcg/dose (ADVAIR) 250-50 mcg/dose diskus inhaler Inhale 1 puff into the lungs 2 (two) times daily.    furosemide (LASIX) 20 MG tablet Take 1 tablet (20 mg total) by mouth once daily.    insulin aspart (NOVOLOG) 100 unit/mL InPn pen Inject 0-5 Units into the skin before meals and at bedtime as needed (Hyperglycemia).    loratadine (CLARITIN) 10 mg tablet Take 10 mg by mouth once daily.    meclizine (ANTIVERT) 32 MG tablet Take 25 mg by mouth 3 (three) times daily as needed.    melatonin 3 mg TbSR Take 2 tablets by mouth every evening.    metoprolol succinate (TOPROL-XL) 25 MG 24 hr tablet Take 25 mg by mouth every evening.     polyethylene glycol (GLYCOLAX) 17 gram PwPk Take 17 g by mouth once daily at 6am.     potassium chloride (KLOR-CON) 20 mEq Pack Take 20 mEq by mouth once daily.    tiotropium (SPIRIVA) 18 mcg inhalation capsule Inhale 18 mcg into the lungs once daily.    valsartan (DIOVAN) 160 MG tablet Take 160 mg by mouth once daily.     Family History     None        Social History Main Topics    Smoking status: Former Smoker     Packs/day: 1.00     Types: Cigarettes     Quit date: 7/5/2012    Smokeless tobacco: Not on file    Alcohol use No    Drug use: No    Sexual activity: No     Review of Systems   Constitutional: Positive for fatigue and fever. Negative for chills and diaphoresis.   HENT: Negative for sore throat and trouble swallowing.    Eyes: Negative for photophobia and visual disturbance.   Respiratory: Positive for shortness of breath. Negative for cough and wheezing.    Cardiovascular: Negative for chest pain and palpitations.   Gastrointestinal: Positive for abdominal pain (Right Lower Quadrant), nausea and vomiting. Negative for constipation and diarrhea.   Endocrine: Negative for polydipsia and polyphagia.   Genitourinary: Negative for decreased urine volume, dysuria,  hematuria and urgency.   Musculoskeletal: Negative for joint swelling, neck pain and neck stiffness.   Neurological: Positive for weakness. Negative for numbness and headaches.   Psychiatric/Behavioral: Negative for agitation, dysphoric mood and suicidal ideas.     Objective:     Vital Signs (Most Recent):    Vital Signs (24h Range):  Temp:  [96.7 °F (35.9 °C)] 96.7 °F (35.9 °C)  Pulse:  [78-89] 86  Resp:  [16-24] 19  SpO2:  [90 %-99 %] 98 %  BP: (100-115)/(51-57) 106/52        There is no height or weight on file to calculate BMI.    Physical Exam   Constitutional: She is oriented to person, place, and time. She appears well-developed and well-nourished. No distress. Nasal cannula in place.   HENT:   Head: Normocephalic and atraumatic.   Mouth/Throat: Oropharynx is clear and moist. No oropharyngeal exudate.   Eyes: Conjunctivae are normal. Pupils are equal, round, and reactive to light. No scleral icterus.   Neck: Neck supple.   Cardiovascular: Normal rate, regular rhythm, normal heart sounds and intact distal pulses.  Exam reveals no gallop and no friction rub.    No murmur heard.  Pulmonary/Chest: Effort normal and breath sounds normal. Tachypnea noted. No respiratory distress. She has no wheezes. She has no rales.   Abdominal: Soft. Bowel sounds are normal. She exhibits no distension. There is tenderness (Right Lower Quadrant). There is no rebound and no guarding.   Musculoskeletal: She exhibits no edema, tenderness or deformity.   Neurological: She is alert and oriented to person, place, and time. She exhibits normal muscle tone.   Skin: Skin is warm and dry. Capillary refill takes 2 to 3 seconds. No rash noted. She is not diaphoretic.   Psychiatric: She has a normal mood and affect. Her behavior is normal. Cognition and memory are impaired. She exhibits abnormal recent memory.   Nursing note and vitals reviewed.        CRANIAL NERVES     CN III, IV, VI   Pupils are equal, round, and reactive to light.        Significant Labs:   CBC:   Recent Labs  Lab 03/17/18  1450   WBC 12.73*   HGB 11.4*   HCT 36.2*        CMP:   Recent Labs  Lab 03/17/18  1450      K 4.0   CL 98   CO2 32*   *   BUN 42*   CREATININE 0.79   CALCIUM 8.2*   PROT 6.1   ALBUMIN 3.2*   BILITOT 0.6   ALKPHOS 83   AST 21   ALT 21   ANIONGAP 7*   EGFRNONAA >60.0     Cardiac Markers: No results for input(s): CKMB, MYOGLOBIN, BNP, TROPISTAT in the last 48 hours.  Coagulation:   Recent Labs  Lab 03/17/18  1450   INR 1.3*     Lactic Acid:   Recent Labs  Lab 03/17/18  1450   LACTATE 0.8     Troponin:   Recent Labs  Lab 03/17/18  1450   TROPONINI <0.012     Urine Studies:   Recent Labs  Lab 03/17/18  1441   COLORU Yellow   APPEARANCEUA Cloudy*   PHUR 6.0   SPECGRAV 1.020   PROTEINUA 1+*   GLUCUA Negative   KETONESU Negative   BILIRUBINUA Negative   OCCULTUA Trace*   NITRITE Negative   UROBILINOGEN Negative   LEUKOCYTESUR 2+*   RBCUA 5*   WBCUA >100*   BACTERIA Moderate*   SQUAMEPITHEL 20   HYALINECASTS 0       ECG 3/17/18: Normal sinus rhythm (HR 86), Possible Inferior infarct ,age undetermined, Anterior ST and T wave abnormality    Significant Imaging: I have reviewed all pertinent imaging results/findings within the past 24 hours.     Chest X-Ray 3/17/18 Mild bilateral pulmonary interstitial and perihilar opacities, as well as mild bibasilar atelectasis.    Abdominal CT 3/17/18:    Acute cholecystitis with a stone in the neck of the gallbladder without ductal dilation      Assessment/Plan:     * Acute cholecystitis    Patient with several days of nausea, vomiting, and right lower quadrant tenderness on palpation and decreased mental status. CT shows cholecystitis with stone on gallbladder neck. No duct dilation. Normal bilirubin, normal liver enzymes, normal wbc, and normal lactic acid.    --Surgery, Dr. Castañeda consulted and requesting IR consult for Cholecystotomy Drain  --Ceftriaxone and metronidazole  --NPO  --Gentle IV fluids           Diabetes mellitus type 2 in obese    2/16/18 HgA1c 5.1  She is only on sliding scale insulin at nursing home, Check blood glucose AC and HS and use SSI. Diabetic diet, when cleared for Diet..              Major depression, single episode    Anxiety  Continue home escitalopram 20 mg daily and clonazepam 0.5 mg BID prn          COPD (chronic obstructive pulmonary disease)    On Home Oxygen (2.5 LPM)  --Continue home oxygen  --Scheduled Breathing treatments as patient has dyspnea on exam, no wheexing  --Continue home fluticisone-salmeterol and loratadine 10 mg daily          Chronic diastolic heart failure    Essential Hypertension  3/20/17 Echo shows EF 55% with diastolic dysfunction, Mild MVR & TVR, PA 41.64  Stable chest x-ray. Does not appear to be clinically volume overloaded.   --Holding home lasix 20 mg daily. Monitor Closely  --Continue home aspirin 81 mg daily, valsartan 160 mg daily and metoprolol XL 25 mg daily            Acute metabolic encephalopathy    Toxic.  Resolving. Sitter PRN          Urinary tract infection without hematuria    Urinalysis with WBC > 100, moderate bacterial, 2+ Leukocytes. She was being treated with Ciprofloxacin for a UTI at the nursing home. Will be covered with Ceftriaxone. Follow Urine Culture            VTE Risk Mitigation         Ordered     IP VTE LOW RISK PATIENT  Once      03/17/18 0974             Winnie Sánchez NP  Department of Hospital Medicine   [facility]

## 2018-03-19 VITALS
SYSTOLIC BLOOD PRESSURE: 119 MMHG | BODY MASS INDEX: 34.75 KG/M2 | DIASTOLIC BLOOD PRESSURE: 88 MMHG | WEIGHT: 216.25 LBS | HEIGHT: 66 IN | RESPIRATION RATE: 20 BRPM | HEART RATE: 97 BPM | TEMPERATURE: 98 F | OXYGEN SATURATION: 91 %

## 2018-03-19 PROBLEM — G93.41 ACUTE METABOLIC ENCEPHALOPATHY: Status: RESOLVED | Noted: 2017-03-20 | Resolved: 2018-03-19

## 2018-03-19 LAB
ALBUMIN SERPL BCP-MCNC: 2.3 G/DL
ALP SERPL-CCNC: 104 U/L
ALT SERPL W/O P-5'-P-CCNC: 8 U/L
ANION GAP SERPL CALC-SCNC: 8 MMOL/L
AST SERPL-CCNC: 15 U/L
BASOPHILS # BLD AUTO: 0.01 K/UL
BASOPHILS NFR BLD: 0.1 %
BILIRUB SERPL-MCNC: 0.5 MG/DL
BUN SERPL-MCNC: 22 MG/DL
CALCIUM SERPL-MCNC: 8.6 MG/DL
CHLORIDE SERPL-SCNC: 107 MMOL/L
CO2 SERPL-SCNC: 27 MMOL/L
CREAT SERPL-MCNC: 0.6 MG/DL
DIFFERENTIAL METHOD: ABNORMAL
EOSINOPHIL # BLD AUTO: 0.3 K/UL
EOSINOPHIL NFR BLD: 2.7 %
ERYTHROCYTE [DISTWIDTH] IN BLOOD BY AUTOMATED COUNT: 14.8 %
EST. GFR  (AFRICAN AMERICAN): >60 ML/MIN/1.73 M^2
EST. GFR  (NON AFRICAN AMERICAN): >60 ML/MIN/1.73 M^2
GLUCOSE SERPL-MCNC: 86 MG/DL
HCT VFR BLD AUTO: 37.2 %
HGB BLD-MCNC: 11.3 G/DL
LYMPHOCYTES # BLD AUTO: 0.9 K/UL
LYMPHOCYTES NFR BLD: 8.6 %
MAGNESIUM SERPL-MCNC: 2.2 MG/DL
MCH RBC QN AUTO: 27.9 PG
MCHC RBC AUTO-ENTMCNC: 30.4 G/DL
MCV RBC AUTO: 92 FL
MONOCYTES # BLD AUTO: 0.8 K/UL
MONOCYTES NFR BLD: 7.4 %
NEUTROPHILS # BLD AUTO: 8.3 K/UL
NEUTROPHILS NFR BLD: 80.9 %
PHOSPHATE SERPL-MCNC: 2.5 MG/DL
PLATELET # BLD AUTO: 183 K/UL
PMV BLD AUTO: 11.2 FL
POCT GLUCOSE: 115 MG/DL (ref 70–110)
POCT GLUCOSE: 89 MG/DL (ref 70–110)
POTASSIUM SERPL-SCNC: 3.7 MMOL/L
PROT SERPL-MCNC: 5.7 G/DL
RBC # BLD AUTO: 4.05 M/UL
SODIUM SERPL-SCNC: 142 MMOL/L
WBC # BLD AUTO: 10.2 K/UL

## 2018-03-19 PROCEDURE — 99232 SBSQ HOSP IP/OBS MODERATE 35: CPT | Mod: ,,, | Performed by: STUDENT IN AN ORGANIZED HEALTH CARE EDUCATION/TRAINING PROGRAM

## 2018-03-19 PROCEDURE — 94761 N-INVAS EAR/PLS OXIMETRY MLT: CPT

## 2018-03-19 PROCEDURE — 80053 COMPREHEN METABOLIC PANEL: CPT

## 2018-03-19 PROCEDURE — 83735 ASSAY OF MAGNESIUM: CPT

## 2018-03-19 PROCEDURE — S0030 INJECTION, METRONIDAZOLE: HCPCS | Performed by: NURSE PRACTITIONER

## 2018-03-19 PROCEDURE — 36415 COLL VENOUS BLD VENIPUNCTURE: CPT

## 2018-03-19 PROCEDURE — 84100 ASSAY OF PHOSPHORUS: CPT

## 2018-03-19 PROCEDURE — 27000221 HC OXYGEN, UP TO 24 HOURS

## 2018-03-19 PROCEDURE — 85025 COMPLETE CBC W/AUTO DIFF WBC: CPT

## 2018-03-19 PROCEDURE — 25000242 PHARM REV CODE 250 ALT 637 W/ HCPCS: Performed by: NURSE PRACTITIONER

## 2018-03-19 PROCEDURE — 25000003 PHARM REV CODE 250: Performed by: PHYSICIAN ASSISTANT

## 2018-03-19 PROCEDURE — 94640 AIRWAY INHALATION TREATMENT: CPT

## 2018-03-19 PROCEDURE — 25000003 PHARM REV CODE 250: Performed by: NURSE PRACTITIONER

## 2018-03-19 RX ORDER — CIPROFLOXACIN 500 MG/1
500 TABLET ORAL EVERY 12 HOURS
Status: DISCONTINUED | OUTPATIENT
Start: 2018-03-19 | End: 2018-03-19 | Stop reason: HOSPADM

## 2018-03-19 RX ORDER — METRONIDAZOLE 500 MG/1
500 TABLET ORAL 3 TIMES DAILY
Qty: 21 TABLET | Refills: 0 | Status: SHIPPED | OUTPATIENT
Start: 2018-03-19 | End: 2018-03-26

## 2018-03-19 RX ORDER — CIPROFLOXACIN 500 MG/1
500 TABLET ORAL EVERY 12 HOURS
Qty: 14 TABLET | Refills: 0 | Status: SHIPPED | OUTPATIENT
Start: 2018-03-19 | End: 2018-03-26

## 2018-03-19 RX ADMIN — VALSARTAN 160 MG: 160 TABLET, FILM COATED ORAL at 08:03

## 2018-03-19 RX ADMIN — METRONIDAZOLE 500 MG: 500 INJECTION, SOLUTION INTRAVENOUS at 11:03

## 2018-03-19 RX ADMIN — ESCITALOPRAM OXALATE 20 MG: 20 TABLET, FILM COATED ORAL at 08:03

## 2018-03-19 RX ADMIN — TIOTROPIUM BROMIDE 18 MCG: 18 CAPSULE ORAL; RESPIRATORY (INHALATION) at 08:03

## 2018-03-19 RX ADMIN — ASPIRIN 81 MG: 81 TABLET, COATED ORAL at 08:03

## 2018-03-19 RX ADMIN — CETIRIZINE HYDROCHLORIDE 10 MG: 5 TABLET, FILM COATED ORAL at 08:03

## 2018-03-19 RX ADMIN — METRONIDAZOLE 500 MG: 500 INJECTION, SOLUTION INTRAVENOUS at 04:03

## 2018-03-19 RX ADMIN — FLUTICASONE FUROATE AND VILANTEROL TRIFENATATE 1 PUFF: 100; 25 POWDER RESPIRATORY (INHALATION) at 08:03

## 2018-03-19 RX ADMIN — CIPROFLOXACIN 500 MG: 500 TABLET, FILM COATED ORAL at 11:03

## 2018-03-19 NOTE — PROGRESS NOTES
[facility]  General Surgery  Progress Note    Subjective:     Interval History:   Feeling well, rested well overnight  No NV. Tolerating diet  No pain at the moment    Post-Op Info:  * No surgery found *          Medications:  Continuous Infusions:  Scheduled Meds:   aspirin  81 mg Oral Daily    cefTRIAXone (ROCEPHIN) IVPB  1 g Intravenous Q24H    cetirizine  10 mg Oral Daily    ciprofloxacin HCl  500 mg Oral Q12H    docusate sodium  100 mg Oral BID    escitalopram oxalate  20 mg Oral Daily    fluticasone-vilanterol  1 puff Inhalation Daily    metoprolol succinate  25 mg Oral QHS    metronidazole  500 mg Intravenous Q8H    polyethylene glycol  17 g Oral Daily    tiotropium  18 mcg Inhalation Daily    valsartan  160 mg Oral Daily     PRN Meds:acetaminophen, albuterol-ipratropium 2.5mg-0.5mg/3mL, bisacodyl, clonazePAM, dextrose 50%, dextrose 50%, glucagon (human recombinant), glucose, glucose, insulin aspart U-100, meclizine, ondansetron, prochlorperazine, ramelteon, senna-docusate 8.6-50 mg, sodium chloride 0.9%, white petrolatum-mineral oil     Objective:     Vital Signs (Most Recent):  Temp: 98.1 °F (36.7 °C) (03/19/18 0747)  Pulse: 91 (03/19/18 0850)  Resp: 17 (03/19/18 0850)  BP: (!) 135/59 (03/19/18 0747)  SpO2: (!) 90 % (03/19/18 1124) Vital Signs (24h Range):  Temp:  [97.2 °F (36.2 °C)-99.2 °F (37.3 °C)] 98.1 °F (36.7 °C)  Pulse:  [] 91  Resp:  [17-20] 17  SpO2:  [90 %-95 %] 90 %  BP: (114-143)/(59-86) 135/59       Intake/Output Summary (Last 24 hours) at 03/19/18 1126  Last data filed at 03/19/18 0526   Gross per 24 hour   Intake          1051.25 ml   Output              400 ml   Net           651.25 ml       Physical Exam   Constitutional: No distress.   Cardiovascular: Normal rate.    Pulmonary/Chest: Effort normal. No respiratory distress.   Abdominal: Soft. She exhibits no mass.   Nontender even with deep palpation   Neurological: She is alert.   Skin: She is not  diaphoretic.       Significant Labs:  CBC:   Recent Labs  Lab 03/19/18  0509   WBC 10.20   RBC 4.05   HGB 11.3*   HCT 37.2      MCV 92   MCH 27.9   MCHC 30.4*     CMP:   Recent Labs  Lab 03/19/18  0509   GLU 86   CALCIUM 8.6*   ALBUMIN 2.3*   PROT 5.7*      K 3.7   CO2 27      BUN 22   CREATININE 0.6   ALKPHOS 104   ALT 8*   AST 15   BILITOT 0.5       Significant Diagnostics:  I have reviewed all pertinent imaging results/findings within the past 24 hours.    Assessment/Plan:     Active Diagnoses:    Diagnosis Date Noted POA    PRINCIPAL PROBLEM:  Acute cholecystitis [K81.0] 03/17/2018 Yes    On home oxygen therapy [Z99.81] 03/18/2018 Not Applicable     Chronic    COPD (chronic obstructive pulmonary disease) [J44.9]  Yes     Chronic    Diabetes mellitus type 2 in obese [E11.69, E66.9]  Yes    Major depression, single episode [F32.9]  Yes    Chronic diastolic heart failure [I50.32] 03/21/2017 Yes     Chronic    Acute metabolic encephalopathy [G93.41] 03/20/2017 Yes    Urinary tract infection without hematuria [N39.0] 03/20/2017 Yes    Anxiety [F41.9] 11/05/2016 Yes    Essential hypertension [I10] 11/05/2016 Yes     Chronic      Problems Resolved During this Admission:    Diagnosis Date Noted Date Resolved POA   77F with likely mild episode of acute cholecystitis  Poor operative candidate. Given how quickly she improved agree with just abx for now.  Likely discharge to nursing home today      Raudel Castañeda MD  General Surgery  [facility]

## 2018-03-19 NOTE — PROGRESS NOTES
Report given to nurse Hook at [facility]. Transport in place for  for 3 p.m. Patient informed at this time. PIV d/c and tele.

## 2018-03-19 NOTE — PROGRESS NOTES
[facility] will update admission coordinator Shay OG to follow up.

## 2018-03-19 NOTE — PLAN OF CARE
Chief Complaint   Patient presents with    Altered Mental Status       Per EMS, pt has had increased confusion this morning which has progressed to decreased level of consciousness. Pt arouses to loud verbal stimuli. Pt from [facility]. Pt shakes head yes and no and follows commands. Pt appears very drowsy     Pt resident at [facility]. Uses WC and sometimes a RW to get around. Pt requires minimal assist with ADLs and uses agency transportation       03/18/18 1615   Discharge Assessment   Assessment Type Discharge Planning Assessment   Confirmed/corrected address and phone number on facesheet? Yes   Assessment information obtained from? Patient   Expected Length of Stay (days) 2   Communicated expected length of stay with patient/caregiver yes   Prior to hospitilization cognitive status: Alert/Oriented   Prior to hospitalization functional status: Assistive Equipment   Current cognitive status: Alert/Oriented   Current Functional Status: Assistive Equipment;Needs Assistance   Facility Arrived From: [facility] resident   Lives With facility resident   Able to Return to Prior Arrangements yes   Is patient able to care for self after discharge? No   Who are your caregiver(s) and their phone number(s)? Daughter: Hallie Thomas [phone]   Patient's perception of discharge disposition nursing home   Readmission Within The Last 30 Days no previous admission in last 30 days   Patient currently being followed by outpatient case management? No   Patient currently receives any other outside agency services? No   Equipment Currently Used at Home walker, rolling;wheelchair  (uses the RW sometimes but mostly WC)   Do you have any problems affording any of your prescribed medications? No   Is the patient taking medications as prescribed? yes   Does the patient have transportation home? Yes   Dialysis Name and Scheduled days N/A   Does the patient receive services at the [facility]  Clinic? No   Discharge Plan A Return to nursing home   Patient/Family In Agreement With Plan yes     Christal Underwood RN Transitional Navigator  [phone]

## 2018-03-19 NOTE — PLAN OF CARE
Patient to d/c back to NH today. Orders reviewed and patient okay to admit back per Ruby @ [facility] admissions.   [place] wheelchair van will come  patient at 3pm .    transfer packet at bedside. Nurse Briana informed to Call report.     Daughter Lisa MIRZA Updated per patient of d/c plan. Daughters states she was not even aware patient was admitted to hospital.  Daughter updated per team and agrees with d/c back to NH today.       03/19/18 1401   Final Note   Assessment Type Final Discharge Note   Discharge Disposition Nurse   What phone number can be called within the next 1-3 days to see how you are doing after discharge? [phone]   Hospital Follow Up  Appt(s) scheduled? (per MEdical director)   Discharge plans and expectations educations in teach back method with documentation complete? Yes   Right Care Referral Info   Post Acute Recommendation Other

## 2018-03-19 NOTE — PLAN OF CARE
[facility]     Department of Hospital Medicine     [address] [place] [address]     [phone] after hours  [phone] fax       NURSING HOME ORDERS    03/19/2018    Admit to Nursing Home:  Regular Bed     Diagnoses:  Active Hospital Problems    Diagnosis  POA    *Acute cholecystitis [K81.0]  Yes     Priority: 1 - High    COPD (chronic obstructive pulmonary disease) [J44.9]  Yes     Priority: 2      Chronic    Diabetes mellitus type 2 in obese [E11.69, E66.9]  Yes     Priority: 2     Acute metabolic encephalopathy [G93.41]  Yes     Priority: 2     Urinary tract infection without hematuria [N39.0]  Yes     Priority: 2     Chronic diastolic heart failure [I50.32]  Yes     Priority: 3      Chronic    Major depression, single episode [F32.9]  Yes     Priority: 4     On home oxygen therapy [Z99.81]  Not Applicable     Chronic    Anxiety [F41.9]  Yes    Essential hypertension [I10]  Yes     Chronic      Resolved Hospital Problems    Diagnosis Date Resolved POA   No resolved problems to display.       Patient is homebound due to:  Acute cholecystitis    Allergies:Review of patient's allergies indicates:  No Known Allergies    Vitals: Per facility protocol    Diet: Cardiac diabetic    Acitivities: Activity as tolerated    LABS:  Per facility protocol    Nursing Precautions:   - Aspiration precautions:                  -  Upright 90 degrees befor during and after meals        - Fall precautions per nursing home protocol   - Decubitus precautions:        -  for positioning   - Pressure reducing foam mattress   - Turn patient every two hours. Use wedge pillows to anchor patient        MISCELLANEOUS CARE:      Routine Skin for Bedridden Patients:  Apply moisture barrier cream to all    skin folds and wet areas in perineal area daily and after baths and                           all bowel movements.  OXYGEN: 2L supplemental O2 PRN to keep sats  >88%           DIABETES CARE:       Check blood sugar:         Fingerstick blood sugar AC and HS        Report CBG < 60 or > 400 to physician.                                          Insulin Sliding Scale          Glucose  Novolog Insulin Subcutaneous        0 - 60   Orange juice or glucose tablet, hold insulin      No insulin   201-250  2 units   251-300  4 units   301-350  6 units   351-400  8 units   >400   10 units then call physician       Laura Negron   Home Medication Instructions SAVANNAH:[id number]    Printed on:03/19/18 1051   Medication Information                      acetaminophen (TYLENOL) 500 MG tablet  Take 500 mg by mouth every 4 (four) hours as needed for Pain.              albuterol-ipratropium 2.5mg-0.5mg/3mL (DUO-NEB) 0.5 mg-3 mg(2.5 mg base)/3 mL nebulizer solution  Take 3 mLs by nebulization every 6 (six) hours as needed for Wheezing.             aspirin (ECOTRIN) 81 MG EC tablet  Take 81 mg by mouth once daily.             ciprofloxacin HCl (CIPRO) 500 MG tablet  Take 1 tablet (500 mg total) by mouth every 12 (twelve) hours.  End date: 3/26/2018           clonazePAM (KLONOPIN) 0.5 MG tablet  Take 1 tablet (0.5 mg total) by mouth 2 (two) times daily as needed (anxiety).             dextran 70-hypromellose (TEARS) ophthalmic solution  Place 2 drops into both eyes 2 (two) times daily.             docusate sodium (COLACE) 100 MG capsule  Take 1 capsule (100 mg total) by mouth 2 (two) times daily.             escitalopram oxalate (LEXAPRO) 20 MG tablet  Take 20 mg by mouth once daily.             fluticasone-salmeterol 250-50 mcg/dose (ADVAIR) 250-50 mcg/dose diskus inhaler  Inhale 1 puff into the lungs 2 (two) times daily.             furosemide (LASIX) 20 MG tablet  Take 1 tablet (20 mg total) by mouth once daily.             insulin aspart (NOVOLOG) 100 unit/mL InPn pen  Inject 0-5 Units into the skin before meals and at bedtime as needed (Hyperglycemia).             loratadine  (CLARITIN) 10 mg tablet  Take 10 mg by mouth once daily.             meclizine (ANTIVERT) 32 MG tablet  Take 25 mg by mouth 3 (three) times daily as needed.             melatonin 3 mg TbSR  Take 2 tablets by mouth every evening.             metoprolol succinate (TOPROL-XL) 25 MG 24 hr tablet  Take 25 mg by mouth every evening.              metroNIDAZOLE (FLAGYL) 500 MG tablet  Take 1 tablet (500 mg total) by mouth 3 (three) times daily.  End date: 3/26/18           polyethylene glycol (GLYCOLAX) 17 gram PwPk  Take 17 g by mouth once daily at 6am.              potassium chloride (KLOR-CON) 20 mEq Pack  Take 20 mEq by mouth once daily.             tiotropium (SPIRIVA) 18 mcg inhalation capsule  Inhale 18 mcg into the lungs once daily.             valsartan (DIOVAN) 160 MG tablet  Take 160 mg by mouth once daily.                         _________________________________  Maria Dolores Monet PA-C  03/19/2018

## 2018-03-19 NOTE — DISCHARGE SUMMARY
"[facility] Hospital Medicine  Discharge Summary      Patient Name: Laura Negron  MRN: [id number]  Admission Date: 3/17/2018  Hospital Length of Stay: 2 days  Discharge Date and Time:  03/19/2018 1:14 PM  Attending Physician: Vijay Spring MD   Discharging Provider: Maria Dolores Monet PA-C  Primary Care Provider: Primary Doctor No      HPI:   Laura Negron is a 77-year-old female with a past medical history of anemia, anxiety, asthma, constipation, COPD on home oxygen 2.5 lpm, dementia, Type 2 Diabetes, hypertension, depression, and thrombocytopenia who presented to [facility] ED via EMS from [facility] for altered mental status. she has been vomiting once a day for the past 5 days and had some recent constipation with last bowel movement earlier this morning She was afebrile, non-toxic appearing, and hemodynamically stable. At the nursing home she had been "acting funny" since earlier this morning and her level of consciousness gradually deteriorated from there. Head CT is negative. He mental status improved while in the ED and she is now alert and oriented.  She is on day 2 or 3 of oral ciprofloxacin for a urinary tract infection. She had Mild rhonchi noted bilaterally on auscultation of lung fields. Chest X-Ray is concerning for concern for mild bilateral pulmonary interstitial and perihilar opacities, as well as mild bibasilar atelectasis.  ED workup revealed labs which are notable for mild increase to WBC (12.73), stable anemia, and an increased BUN of 42 (up from 19) and a normal creatinine. Lactic Acid is normal. Urinalysis does show urinary tract infection. Urine drug screen is negative. Her abdomen was tender to palpation in her right lower quadrant abdominal CT was done which showed  acute cholecystitis with a stone in the neck of the gallbladder without ductal dilation.  She has normal bilirubin, normal liver enzymes. Dr. Jay with surgery " was consulted who covers here and at [facility].  He requested transfer to [facility] for IR consultation for cholecystostomy  drain. She is admitted to [facility]. She was treated with 1liter of normal saline, ceftriaxone and metronidazole on the ED.         * No surgery found *      Hospital Course:   Pt receiving ceftriaxone and flagyl. Initial plan was for cholecystostomy drain; however, d/t the pt's comorbidities and refusal of surgery conservative treatment with metronidazole and ciprofloxacin was pursued. She was d/bruna with abx.      Consults:   Consults         Status Ordering Provider     General Surgery  Once     Provider:  Raudel Castañeda MD    Completed DANIELE VASQUEZ          * Acute cholecystitis    Patient with several days of nausea, vomiting, and right lower quadrant tenderness on palpation and decreased mental status. CT shows cholecystitis with stone on gallbladder neck. No duct dilation. Normal bilirubin, normal liver enzymes, normal wbc, and normal lactic acid.  No complaints today, WBC 12.6, afebrile.  Will pursue conservative treatment with ceftriaxone and metronidazole. Advance diet as tolerated.         Diabetes mellitus type 2 in obese    2/16/18 HgA1c 5.1. .  She is only on sliding scale insulin at nursing home, Check blood glucose AC and HS and use SSI. Diabetic diet.            COPD (chronic obstructive pulmonary disease)    On Home Oxygen (2.5 LPM)  No wheezing or SOB.   Duonebs PRN, continue home fluticasone salmeterol and loratadine            Urinary tract infection without hematuria    Urinalysis with WBC > 100, moderate bacterial, 2+ Leukocytes. She was being treated with Ciprofloxacin for a UTI at the nursing home.   Continue cipro at d/c. F/U cultures.         Acute metabolic encephalopathy-resolved as of 3/19/2018    Toxic.    Resolved.        Chronic diastolic heart failure    Essential Hypertension  3/20/17 Echo shows EF  55% with diastolic dysfunction, Mild MVR & TVR, PA 41.64  Stable chest x-ray. Does not appear to be clinically volume overloaded. -143.   Resume home lasix 20 mg daily. Monitor Closely  Continue home aspirin 81 mg daily, valsartan 160 mg daily and metoprolol XL 25 mg daily            Major depression, single episode    Anxiety  Calm and pleasant on exam.   Continue home escitalopram 20 mg daily and clonazepam 0.5 mg BID prn            Final Active Diagnoses:    Diagnosis Date Noted POA    PRINCIPAL PROBLEM:  Acute cholecystitis [K81.0] 03/17/2018 Yes    COPD (chronic obstructive pulmonary disease) [J44.9]  Yes     Chronic    Diabetes mellitus type 2 in obese [E11.69, E66.9]  Yes    Urinary tract infection without hematuria [N39.0] 03/20/2017 Yes    Chronic diastolic heart failure [I50.32] 03/21/2017 Yes     Chronic    Major depression, single episode [F32.9]  Yes    On home oxygen therapy [Z99.81] 03/18/2018 Not Applicable     Chronic    Anxiety [F41.9] 11/05/2016 Yes    Essential hypertension [I10] 11/05/2016 Yes     Chronic      Problems Resolved During this Admission:    Diagnosis Date Noted Date Resolved POA    Acute metabolic encephalopathy [G93.41] 03/20/2017 03/19/2018 Yes       Discharged Condition: stable    Disposition: Long Term Care    Follow Up:  Follow-up Information     [facility].    Specialties:  Nursing Home Agency, SNF Agency  Why:  Nursing Home  Contact information:  [place] [id number]  [phone]                 Patient Instructions:     Diet Cardiac     Vital signs per facility protocol     Intake and output per facility protocol     Skin assessment every shift      Notify Physician   Order Specific Question Answer Comments   Temperature (F) greater than 101    Systolic Blood Pressure SBP greater than or equal to 160    Systolic Blood Pressure SBP less than or equal to 90    Diastolic Blood Pressure DBP greater than or equal to 110    Diastolic  Blood Pressure DBP less than or equal to 60    Pulse greater than or equal to 120    Pulse less than or equal to 50    Respirations Rate RR greater than or equal to 30    Respirations Rate RR less than or equal to 6    Urine output less than 60 over 2 hours   SPO2% less than 90      Up in chair/ wheel chair     Activity as tolerated     Pulse Oximetry         Significant Diagnostic Studies: Labs:   CMP   Recent Labs  Lab 03/17/18  1450 03/18/18  0507 03/19/18  0509    142 142   K 4.0 4.1 3.7   CL 98 105 107   CO2 32* 26 27   * 102 86   BUN 42* 33* 22   CREATININE 0.79 0.7 0.6   CALCIUM 8.2* 8.5* 8.6*   PROT 6.1 5.8* 5.7*   ALBUMIN 3.2* 2.4* 2.3*   BILITOT 0.6 0.5 0.5   ALKPHOS 83 97 104   AST 21 19 15   ALT 21 10 8*   ANIONGAP 7* 11 8   ESTGFRAFRICA >60.0 >60 >60   EGFRNONAA >60.0 >60 >60    and CBC   Recent Labs  Lab 03/17/18  1450 03/18/18  0507 03/19/18  0509   WBC 12.73* 12.62 10.20   HGB 11.4* 11.0* 11.3*   HCT 36.2* 35.7* 37.2    186 183       Pending Diagnostic Studies:     None         Medications:  Reconciled Home Medications:   Current Discharge Medication List      START taking these medications    Details   metroNIDAZOLE (FLAGYL) 500 MG tablet Take 1 tablet (500 mg total) by mouth 3 (three) times daily.  Qty: 21 tablet, Refills: 0    Associated Diagnoses: Acute cholecystitis         CONTINUE these medications which have CHANGED    Details   ciprofloxacin HCl (CIPRO) 500 MG tablet Take 1 tablet (500 mg total) by mouth every 12 (twelve) hours.  Qty: 14 tablet, Refills: 0    Associated Diagnoses: Acute cholecystitis         CONTINUE these medications which have NOT CHANGED    Details   acetaminophen (TYLENOL) 500 MG tablet Take 500 mg by mouth every 4 (four) hours as needed for Pain.       aspirin (ECOTRIN) 81 MG EC tablet Take 81 mg by mouth once daily.      clonazePAM (KLONOPIN) 0.5 MG tablet Take 1 tablet (0.5 mg total) by mouth 2 (two) times daily as needed (anxiety).  Qty: 10  tablet, Refills: 0      dextran 70-hypromellose (TEARS) ophthalmic solution Place 2 drops into both eyes 2 (two) times daily.      docusate sodium (COLACE) 100 MG capsule Take 1 capsule (100 mg total) by mouth 2 (two) times daily.  Refills: 0      escitalopram oxalate (LEXAPRO) 20 MG tablet Take 20 mg by mouth once daily.      fluticasone-salmeterol 250-50 mcg/dose (ADVAIR) 250-50 mcg/dose diskus inhaler Inhale 1 puff into the lungs 2 (two) times daily.      furosemide (LASIX) 20 MG tablet Take 1 tablet (20 mg total) by mouth once daily.      insulin aspart (NOVOLOG) 100 unit/mL InPn pen Inject 0-5 Units into the skin before meals and at bedtime as needed (Hyperglycemia).  Refills: 0      loratadine (CLARITIN) 10 mg tablet Take 10 mg by mouth once daily.      meclizine (ANTIVERT) 32 MG tablet Take 25 mg by mouth 3 (three) times daily as needed.      melatonin 3 mg TbSR Take 2 tablets by mouth every evening.      metoprolol succinate (TOPROL-XL) 25 MG 24 hr tablet Take 25 mg by mouth every evening.       polyethylene glycol (GLYCOLAX) 17 gram PwPk Take 17 g by mouth once daily at 6am.       potassium chloride (KLOR-CON) 20 mEq Pack Take 20 mEq by mouth once daily.      tiotropium (SPIRIVA) 18 mcg inhalation capsule Inhale 18 mcg into the lungs once daily.      valsartan (DIOVAN) 160 MG tablet Take 160 mg by mouth once daily.      albuterol-ipratropium 2.5mg-0.5mg/3mL (DUO-NEB) 0.5 mg-3 mg(2.5 mg base)/3 mL nebulizer solution Take 3 mLs by nebulization every 6 (six) hours as needed for Wheezing.  Refills: 0             Indwelling Lines/Drains at time of discharge:   Lines/Drains/Airways          No matching active lines, drains, or airways          Time spent on the discharge of patient: 45 minutes  Patient was seen and examined on the date of discharge and determined to be suitable for discharge.         Maria Dolores Monet PA-C  Department of [facility] Medicine  [facility]

## 2018-03-19 NOTE — PLAN OF CARE
Problem: Patient Care Overview  Goal: Plan of Care Review  Outcome: Ongoing (interventions implemented as appropriate)  Pt's SpO2 95% on 2 lpm NC. Pt stated she wears 2 lpm NC at home. PRN aerosol tx not required. No adverse reactions to MDIs. No respiratory distress noted. Continue to monitor SpO2.

## 2018-04-17 PROBLEM — R07.9 CHEST PAIN: Status: ACTIVE | Noted: 2018-04-17

## 2018-04-18 PROBLEM — K81.0 ACUTE CHOLECYSTITIS: Status: RESOLVED | Noted: 2018-03-17 | Resolved: 2018-04-18

## 2018-04-18 PROBLEM — J44.1 COPD EXACERBATION: Status: ACTIVE | Noted: 2018-04-18

## 2018-04-18 PROBLEM — J18.9 PNA (PNEUMONIA): Status: ACTIVE | Noted: 2018-04-18

## 2018-04-18 PROBLEM — N39.0 URINARY TRACT INFECTION WITHOUT HEMATURIA: Status: RESOLVED | Noted: 2017-03-20 | Resolved: 2018-04-18

## 2018-04-18 PROBLEM — E87.6 HYPOKALEMIA: Status: RESOLVED | Noted: 2017-05-29 | Resolved: 2018-04-18

## 2018-04-18 PROBLEM — D69.6 THROMBOCYTOPENIA, UNSPECIFIED: Status: ACTIVE | Noted: 2018-04-18

## 2018-04-29 PROBLEM — D72.829 LEUKOCYTOSIS: Status: RESOLVED | Noted: 2017-03-20 | Resolved: 2018-04-29

## 2018-10-08 ENCOUNTER — OFFICE VISIT (OUTPATIENT)
Dept: UROLOGY | Facility: CLINIC | Age: 78
End: 2018-10-08
Payer: MEDICARE

## 2018-10-08 VITALS
RESPIRATION RATE: 18 BRPM | WEIGHT: 199 LBS | BODY MASS INDEX: 31.98 KG/M2 | DIASTOLIC BLOOD PRESSURE: 66 MMHG | SYSTOLIC BLOOD PRESSURE: 126 MMHG | HEART RATE: 74 BPM | HEIGHT: 66 IN | OXYGEN SATURATION: 97 %

## 2018-10-08 DIAGNOSIS — Z23 IMMUNIZATION DUE: ICD-10-CM

## 2018-10-08 DIAGNOSIS — R30.0 DYSURIA: Primary | ICD-10-CM

## 2018-10-08 PROCEDURE — G0009 ADMIN PNEUMOCOCCAL VACCINE: HCPCS | Mod: PBBFAC

## 2018-10-08 PROCEDURE — 99999 PR PBB SHADOW E&M-EST. PATIENT-LVL V: CPT | Mod: PBBFAC,,, | Performed by: NURSE PRACTITIONER

## 2018-10-08 PROCEDURE — 99212 OFFICE O/P EST SF 10 MIN: CPT | Mod: S$PBB,,, | Performed by: NURSE PRACTITIONER

## 2018-10-08 PROCEDURE — 90670 PCV13 VACCINE IM: CPT | Mod: PBBFAC,PO | Performed by: NURSE PRACTITIONER

## 2018-10-08 PROCEDURE — 99215 OFFICE O/P EST HI 40 MIN: CPT | Mod: PBBFAC,PO | Performed by: NURSE PRACTITIONER

## 2018-10-08 NOTE — PROGRESS NOTES
Subjective:       Patient ID: Laura Negron is a 78 y.o. female.    Chief Complaint: Urinary Tract Infection    Patient is a 79 yo WF who is here today for dysuria. She wears a depend for moments she's unable to get someone to assist her to the restroom. Patient resides at [facility] and is here today with a caregiver from nursing home. Patient tried to give a urine sample today in clinic, but missed the collection hat/container that was in the toilet. External orders (U/A and urine culture) given to patient and caregiver for nursing home collect.       Urinary Tract Infection    This is a chronic problem. Episode onset: approximately 1 year  The problem occurs intermittently. The problem has been waxing and waning. The quality of the pain is described as burning. The pain is at a severity of 10/10. The pain is severe. There has been no fever. She is not sexually active. There is no history of pyelonephritis. Associated symptoms include frequency, nausea and urgency. Pertinent negatives include no behavior changes, chills, discharge, flank pain, hematuria, hesitancy, possible pregnancy, sweats, vomiting, weight loss, bubble bath use, constipation, rash or withholding. She has tried nothing for the symptoms. Her past medical history is significant for diabetes mellitus and hypertension.     Review of Systems   Constitutional: Negative for appetite change, chills, fever and weight loss.   Respiratory: Positive for shortness of breath.    Gastrointestinal: Positive for nausea. Negative for abdominal pain, constipation, diarrhea and vomiting.   Genitourinary: Positive for dysuria, frequency and urgency. Negative for decreased urine volume, difficulty urinating, flank pain, hematuria, hesitancy, menstrual problem, vaginal bleeding, vaginal discharge and vaginal pain.   Skin: Negative for rash.   Neurological: Positive for dizziness and weakness. Negative for headaches.   Psychiatric/Behavioral: Negative.         Objective:      Physical Exam   Constitutional: She is oriented to person, place, and time. No distress.   Obese   HENT:   Head: Normocephalic and atraumatic.   Eyes: EOM are normal. Pupils are equal, round, and reactive to light.   Neck: Normal range of motion.   Cardiovascular: Normal rate.   Pulmonary/Chest: Effort normal. No respiratory distress.   Patient currently on 2 liters NC.    Abdominal: Soft. There is no tenderness.   Musculoskeletal: Normal range of motion. She exhibits no edema.   Patient currently in wheelchair.    Neurological: She is alert and oriented to person, place, and time. Coordination normal.   Skin: Skin is warm and dry.   Psychiatric: She has a normal mood and affect. Her behavior is normal. Judgment and thought content normal.   Nursing note and vitals reviewed.      Assessment:       1. Dysuria    2. Immunization due        Plan:       Laura was seen today for urinary tract infection.    Diagnoses and all orders for this visit:    Dysuria  -     Urinalysis; Future  -     Urine culture; Future    Immunization due  -     (In Office Administered) Pneumococcal Conjugate Vaccine (13 Valent) (IM)    Follow-up pending lab results.    Kathryn Suarez NP

## 2018-10-08 NOTE — PATIENT INSTRUCTIONS
U/A and urine culture ([facility] collect); fax results to clinic.   Prevnar 13 vaccine today per patient request.   Follow-up pending lab results.

## 2018-11-26 ENCOUNTER — OFFICE VISIT (OUTPATIENT)
Dept: UROLOGY | Facility: CLINIC | Age: 78
End: 2018-11-26
Payer: MEDICARE

## 2018-11-26 VITALS
WEIGHT: 199 LBS | DIASTOLIC BLOOD PRESSURE: 73 MMHG | HEART RATE: 85 BPM | BODY MASS INDEX: 31.98 KG/M2 | SYSTOLIC BLOOD PRESSURE: 118 MMHG | OXYGEN SATURATION: 98 % | HEIGHT: 66 IN | RESPIRATION RATE: 18 BRPM

## 2018-11-26 DIAGNOSIS — R32 URINARY INCONTINENCE, UNSPECIFIED TYPE: ICD-10-CM

## 2018-11-26 DIAGNOSIS — R30.0 DYSURIA: Primary | ICD-10-CM

## 2018-11-26 DIAGNOSIS — N39.0 RECURRENT UTI: ICD-10-CM

## 2018-11-26 LAB
BACTERIA #/AREA URNS AUTO: ABNORMAL /HPF
BILIRUB UR QL STRIP: NEGATIVE
CLARITY UR REFRACT.AUTO: ABNORMAL
COLOR UR AUTO: YELLOW
GLUCOSE UR QL STRIP: NEGATIVE
HGB UR QL STRIP: ABNORMAL
HYALINE CASTS UR QL AUTO: 0 /LPF
KETONES UR QL STRIP: NEGATIVE
LEUKOCYTE ESTERASE UR QL STRIP: ABNORMAL
MICROSCOPIC COMMENT: ABNORMAL
NITRITE UR QL STRIP: NEGATIVE
PH UR STRIP: 7 [PH] (ref 5–8)
PROT UR QL STRIP: ABNORMAL
RBC #/AREA URNS AUTO: 17 /HPF (ref 0–4)
SP GR UR STRIP: 1.01 (ref 1–1.03)
SQUAMOUS #/AREA URNS AUTO: >100 /HPF
URN SPEC COLLECT METH UR: ABNORMAL
WBC #/AREA URNS AUTO: >100 /HPF (ref 0–5)
WBC CLUMPS UR QL AUTO: ABNORMAL

## 2018-11-26 PROCEDURE — 87186 SC STD MICRODIL/AGAR DIL: CPT

## 2018-11-26 PROCEDURE — 81001 URINALYSIS AUTO W/SCOPE: CPT

## 2018-11-26 PROCEDURE — 99999 PR PBB SHADOW E&M-EST. PATIENT-LVL V: CPT | Mod: PBBFAC,,, | Performed by: NURSE PRACTITIONER

## 2018-11-26 PROCEDURE — 87077 CULTURE AEROBIC IDENTIFY: CPT

## 2018-11-26 PROCEDURE — 99215 OFFICE O/P EST HI 40 MIN: CPT | Mod: PBBFAC,PO | Performed by: NURSE PRACTITIONER

## 2018-11-26 PROCEDURE — 99214 OFFICE O/P EST MOD 30 MIN: CPT | Mod: S$PBB,,, | Performed by: NURSE PRACTITIONER

## 2018-11-26 PROCEDURE — 87086 URINE CULTURE/COLONY COUNT: CPT

## 2018-11-26 PROCEDURE — 87088 URINE BACTERIA CULTURE: CPT

## 2018-11-26 RX ORDER — PHENAZOPYRIDINE HYDROCHLORIDE 100 MG/1
100 TABLET, FILM COATED ORAL 3 TIMES DAILY PRN
Qty: 9 TABLET | Refills: 0 | Status: SHIPPED | OUTPATIENT
Start: 2018-11-26 | End: 2018-11-29

## 2018-11-26 NOTE — PROGRESS NOTES
Subjective:       Patient ID: Laura Negron is a 78 y.o. female.    Chief Complaint: Urinary Tract Infection (recurrent every 4 months / patient states she wears diapers and that may be the reason)    Patient is a 79 yo WF who is here today for dysuria and recurrent UTI's. She wears a depend for urinary incontinence. Patient resides at [facility] and is here today with a caregiver from nursing home.       Dysuria    This is a chronic problem. Episode onset: approximately 4 months. The problem has been waxing and waning. The quality of the pain is described as burning. The pain is at a severity of 6/10. The pain is moderate. There has been no fever. She is not sexually active. There is no history of pyelonephritis. Associated symptoms include hesitancy, nausea and urgency. Pertinent negatives include no behavior changes, chills, discharge, flank pain, frequency, hematuria, possible pregnancy, vomiting, weight loss, bubble bath use, constipation, rash or withholding. She has tried nothing for the symptoms. Her past medical history is significant for diabetes mellitus, hypertension and recurrent UTIs. There is no history of kidney stones, a single kidney or a urological procedure.     Review of Systems   Constitutional: Positive for fatigue. Negative for appetite change, chills, fever and weight loss.   Gastrointestinal: Positive for nausea. Negative for abdominal pain, constipation, diarrhea and vomiting.   Genitourinary: Positive for dysuria, hesitancy and urgency. Negative for decreased urine volume, difficulty urinating, flank pain, frequency, hematuria, pelvic pain, vaginal bleeding, vaginal discharge and vaginal pain. Enuresis: 4 months.        Urinary hesitancy  Urinary incontinence   Skin: Negative for rash.   Neurological: Positive for weakness.   Psychiatric/Behavioral: Negative.        Objective:      Physical Exam   Constitutional: She is oriented to person, place, and time. No distress.    HENT:   Head: Normocephalic and atraumatic.   Eyes: EOM are normal. Pupils are equal, round, and reactive to light.   Neck: Normal range of motion.   Cardiovascular: Normal rate.   Pulmonary/Chest: Effort normal. No respiratory distress.   Currently on oxygen 3 liters NC   Abdominal: Soft. There is no tenderness.   Musculoskeletal: Normal range of motion.   Patient currently in wheelchair.   Neurological: She is alert and oriented to person, place, and time.   Skin: Skin is warm and dry.   Psychiatric: She has a normal mood and affect. Her behavior is normal. Judgment and thought content normal.   Nursing note and vitals reviewed.      Assessment:       1. Dysuria    2. Recurrent UTI    3. Urinary incontinence, unspecified type        Plan:       Laura was seen today for urinary tract infection.    Diagnoses and all orders for this visit:    Dysuria  -     phenazopyridine (PYRIDIUM) 100 MG tablet; Take 1 tablet (100 mg total) by mouth 3 (three) times daily as needed for Pain.  -     Urinalysis  -     Urine culture    Recurrent UTI    Urinary incontinence, unspecified type    Other  orders  1. Scheduled in office cystoscopy with Dr. Rizo for recurrent UTIs on 1/8/2019.    Kathryn Suarez NP

## 2018-11-26 NOTE — PATIENT INSTRUCTIONS
U/A & Urine culture (in&out cath)  Take pyridium for bladder discomfort as directed.   Scheduled in office cystoscopy with Dr. Rizo for recurrent UTIs on 1/8/2019.

## 2018-11-29 ENCOUNTER — TELEPHONE (OUTPATIENT)
Dept: UROLOGY | Facility: CLINIC | Age: 78
End: 2018-11-29

## 2018-11-29 DIAGNOSIS — N30.01 ACUTE CYSTITIS WITH HEMATURIA: Primary | ICD-10-CM

## 2018-11-29 LAB — BACTERIA UR CULT: NORMAL

## 2018-11-29 RX ORDER — NITROFURANTOIN 25; 75 MG/1; MG/1
100 CAPSULE ORAL 2 TIMES DAILY
Qty: 28 CAPSULE | Refills: 0 | Status: SHIPPED | OUTPATIENT
Start: 2018-11-29 | End: 2018-12-13

## 2018-11-29 NOTE — TELEPHONE ENCOUNTER
----- Message from Kathryn Suarez NP sent at 11/29/2018  2:06 PM CST -----  Patient has a UTI. Start Macrobid (generic) by mouth twice daily x14 days. Script sent to [facility] in [place], [place]. Please notify patient's nurse at [facility].

## 2018-12-17 PROBLEM — J18.9 PNA (PNEUMONIA): Status: RESOLVED | Noted: 2018-04-18 | Resolved: 2018-12-17

## 2018-12-17 PROBLEM — R34 DECREASED URINE OUTPUT: Status: RESOLVED | Noted: 2017-05-29 | Resolved: 2018-12-17

## 2018-12-17 PROBLEM — J44.1 COPD EXACERBATION: Status: ACTIVE | Noted: 2018-12-17

## 2018-12-17 PROBLEM — J96.11 CHRONIC RESPIRATORY FAILURE WITH HYPOXIA: Status: ACTIVE | Noted: 2018-03-18

## 2018-12-17 PROBLEM — D69.6 THROMBOCYTOPENIA, UNSPECIFIED: Status: RESOLVED | Noted: 2018-04-18 | Resolved: 2018-12-17

## 2018-12-17 PROBLEM — R07.9 CHEST PAIN: Status: RESOLVED | Noted: 2018-04-17 | Resolved: 2018-12-17

## 2019-01-08 ENCOUNTER — PROCEDURE VISIT (OUTPATIENT)
Dept: UROLOGY | Facility: CLINIC | Age: 79
End: 2019-01-08
Payer: MEDICARE

## 2019-01-08 VITALS — WEIGHT: 193 LBS | BODY MASS INDEX: 31.02 KG/M2 | HEIGHT: 66 IN

## 2019-01-08 DIAGNOSIS — N39.0 RECURRENT UTI: ICD-10-CM

## 2019-01-08 DIAGNOSIS — N39.41 URGE URINARY INCONTINENCE: ICD-10-CM

## 2019-01-08 DIAGNOSIS — R35.0 URINARY FREQUENCY: ICD-10-CM

## 2019-01-08 PROCEDURE — 52000 CYSTOURETHROSCOPY: CPT | Mod: PBBFAC,PO | Performed by: UROLOGY

## 2019-01-08 PROCEDURE — 52000 CYSTOSCOPY: ICD-10-PCS | Mod: S$PBB,,, | Performed by: UROLOGY

## 2019-01-08 RX ORDER — CIPROFLOXACIN 500 MG/1
500 TABLET ORAL 2 TIMES DAILY
Qty: 10 TABLET | Refills: 0 | Status: SHIPPED | OUTPATIENT
Start: 2019-01-08 | End: 2019-01-13

## 2019-01-08 NOTE — PROCEDURES
"Cystoscopy  Date/Time: 1/8/2019 2:51 PM  Performed by: Antonio Rizo MD  Authorized by: Antonio Rizo MD     Consent Done?:  Yes (Written)  Time out: Immediately prior to procedure a "time out" was called to verify the correct patient, procedure, equipment, support staff and site/side marked as required.    Indications: recurrent UTIs, incontinence and dysuria    Position:  Dorsal lithotomy  Anesthesia:  Intraurethral instillation  Patient sedated?: No    Preparation: Patient was prepped and draped in usual sterile fashion      Scope type:  Flexible cystoscope  Stent inserted: No    Stent removed: No    External exam normal: Yes    Digital exam performed: Yes    Urethra normal: Yes  Bladder neck normal: Bladder neck normal   Bladder normal: Yes      Patient tolerance:  Patient tolerated the procedure well with no immediate complications      "

## 2019-03-13 PROBLEM — J44.1 COPD EXACERBATION: Status: ACTIVE | Noted: 2019-03-13

## 2019-03-13 PROBLEM — J10.1 INFLUENZA A: Status: ACTIVE | Noted: 2019-03-13

## 2019-03-13 PROBLEM — J96.21 ACUTE ON CHRONIC RESPIRATORY FAILURE WITH HYPOXIA: Status: ACTIVE | Noted: 2018-03-18

## 2019-03-14 PROBLEM — J44.1 COPD EXACERBATION: Status: ACTIVE | Noted: 2019-03-14

## 2019-03-16 PROBLEM — J96.21 ACUTE ON CHRONIC RESPIRATORY FAILURE WITH HYPOXIA: Status: RESOLVED | Noted: 2018-03-18 | Resolved: 2019-03-16

## 2019-03-18 ENCOUNTER — HOSPITAL ENCOUNTER (INPATIENT)
Facility: HOSPITAL | Age: 79
LOS: 7 days | Discharge: HOME | DRG: 291 | End: 2019-03-25
Attending: EMERGENCY MEDICINE | Admitting: EMERGENCY MEDICINE
Payer: MEDICARE

## 2019-03-18 DIAGNOSIS — J96.21 ACUTE ON CHRONIC RESPIRATORY FAILURE WITH HYPOXIA AND HYPERCAPNIA: ICD-10-CM

## 2019-03-18 DIAGNOSIS — R06.02 SHORTNESS OF BREATH: ICD-10-CM

## 2019-03-18 DIAGNOSIS — R79.89 ELEVATED TROPONIN: Primary | ICD-10-CM

## 2019-03-18 DIAGNOSIS — J96.22 ACUTE ON CHRONIC RESPIRATORY FAILURE WITH HYPOXIA AND HYPERCAPNIA: ICD-10-CM

## 2019-03-18 DIAGNOSIS — I50.9 HEART FAILURE: ICD-10-CM

## 2019-03-18 LAB
ALBUMIN SERPL BCP-MCNC: 2.9 G/DL
ALLENS TEST: ABNORMAL
ALP SERPL-CCNC: 92 U/L
ALT SERPL W/O P-5'-P-CCNC: 11 U/L
ANION GAP SERPL CALC-SCNC: 9 MMOL/L
AST SERPL-CCNC: 25 U/L
BASOPHILS # BLD AUTO: 0.03 K/UL
BASOPHILS NFR BLD: 0.2 %
BILIRUB SERPL-MCNC: 0.8 MG/DL
BNP SERPL-MCNC: 1125 PG/ML
BUN SERPL-MCNC: 12 MG/DL
CALCIUM SERPL-MCNC: 9.4 MG/DL
CHLORIDE SERPL-SCNC: 89 MMOL/L
CO2 SERPL-SCNC: 38 MMOL/L
CREAT SERPL-MCNC: 0.6 MG/DL
DELSYS: ABNORMAL
DIFFERENTIAL METHOD: ABNORMAL
EOSINOPHIL # BLD AUTO: 0 K/UL
EOSINOPHIL NFR BLD: 0 %
EP: 5
ERYTHROCYTE [DISTWIDTH] IN BLOOD BY AUTOMATED COUNT: 15.1 %
ERYTHROCYTE [SEDIMENTATION RATE] IN BLOOD BY WESTERGREN METHOD: 10 MM/H
EST. GFR  (AFRICAN AMERICAN): >60 ML/MIN/1.73 M^2
EST. GFR  (NON AFRICAN AMERICAN): >60 ML/MIN/1.73 M^2
FIO2: 40
GLUCOSE SERPL-MCNC: 129 MG/DL
HCO3 UR-SCNC: 42.8 MMOL/L (ref 24–28)
HCT VFR BLD AUTO: 39.6 %
HGB BLD-MCNC: 12.3 G/DL
IMM GRANULOCYTES # BLD AUTO: 0.17 K/UL
IMM GRANULOCYTES NFR BLD AUTO: 1.1 %
IP: 10
LACTATE SERPL-SCNC: 0.9 MMOL/L
LYMPHOCYTES # BLD AUTO: 0.7 K/UL
LYMPHOCYTES NFR BLD: 4.4 %
MCH RBC QN AUTO: 26.7 PG
MCHC RBC AUTO-ENTMCNC: 31.1 G/DL
MCV RBC AUTO: 86 FL
MIN VOL: 11.7
MODE: ABNORMAL
MONOCYTES # BLD AUTO: 0.5 K/UL
MONOCYTES NFR BLD: 3.5 %
NEUTROPHILS # BLD AUTO: 13.9 K/UL
NEUTROPHILS NFR BLD: 90.8 %
NRBC BLD-RTO: 0 /100 WBC
PCO2 BLDA: 71.3 MMHG (ref 35–45)
PH SMN: 7.39 [PH] (ref 7.35–7.45)
PLATELET # BLD AUTO: 236 K/UL
PMV BLD AUTO: 10.7 FL
PO2 BLDA: 92 MMHG (ref 80–100)
POC BE: 18 MMOL/L
POC SATURATED O2: 97 % (ref 95–100)
POC TCO2: 45 MMOL/L (ref 23–27)
POTASSIUM SERPL-SCNC: 4 MMOL/L
PROT SERPL-MCNC: 6.6 G/DL
RBC # BLD AUTO: 4.61 M/UL
SAMPLE: ABNORMAL
SITE: ABNORMAL
SODIUM SERPL-SCNC: 136 MMOL/L
SP02: 96
SPONT RATE: 24
TROPONIN I SERPL DL<=0.01 NG/ML-MCNC: 0.33 NG/ML
WBC # BLD AUTO: 15.33 K/UL

## 2019-03-18 PROCEDURE — 80053 COMPREHEN METABOLIC PANEL: CPT

## 2019-03-18 PROCEDURE — 96365 THER/PROPH/DIAG IV INF INIT: CPT

## 2019-03-18 PROCEDURE — 83036 HEMOGLOBIN GLYCOSYLATED A1C: CPT

## 2019-03-18 PROCEDURE — 93005 ELECTROCARDIOGRAM TRACING: CPT

## 2019-03-18 PROCEDURE — 63600175 PHARM REV CODE 636 W HCPCS: Performed by: EMERGENCY MEDICINE

## 2019-03-18 PROCEDURE — 87502 INFLUENZA DNA AMP PROBE: CPT | Mod: 59

## 2019-03-18 PROCEDURE — 99291 CRITICAL CARE FIRST HOUR: CPT | Mod: ,,, | Performed by: EMERGENCY MEDICINE

## 2019-03-18 PROCEDURE — 96367 TX/PROPH/DG ADDL SEQ IV INF: CPT

## 2019-03-18 PROCEDURE — 99291 PR CRITICAL CARE, E/M 30-74 MINUTES: ICD-10-PCS | Mod: ,,, | Performed by: EMERGENCY MEDICINE

## 2019-03-18 PROCEDURE — 83605 ASSAY OF LACTIC ACID: CPT

## 2019-03-18 PROCEDURE — 27100171 HC OXYGEN HIGH FLOW UP TO 24 HOURS

## 2019-03-18 PROCEDURE — 96375 TX/PRO/DX INJ NEW DRUG ADDON: CPT

## 2019-03-18 PROCEDURE — 27000221 HC OXYGEN, UP TO 24 HOURS

## 2019-03-18 PROCEDURE — 96376 TX/PRO/DX INJ SAME DRUG ADON: CPT

## 2019-03-18 PROCEDURE — 93010 ELECTROCARDIOGRAM REPORT: CPT | Mod: ,,, | Performed by: INTERNAL MEDICINE

## 2019-03-18 PROCEDURE — 12000002 HC ACUTE/MED SURGE SEMI-PRIVATE ROOM

## 2019-03-18 PROCEDURE — 84484 ASSAY OF TROPONIN QUANT: CPT

## 2019-03-18 PROCEDURE — 83880 ASSAY OF NATRIURETIC PEPTIDE: CPT

## 2019-03-18 PROCEDURE — 27000190 HC CPAP FULL FACE MASK W/VALVE

## 2019-03-18 PROCEDURE — 94761 N-INVAS EAR/PLS OXIMETRY MLT: CPT

## 2019-03-18 PROCEDURE — 94640 AIRWAY INHALATION TREATMENT: CPT

## 2019-03-18 PROCEDURE — 93010 EKG 12-LEAD: ICD-10-PCS | Mod: ,,, | Performed by: INTERNAL MEDICINE

## 2019-03-18 PROCEDURE — 94660 CPAP INITIATION&MGMT: CPT

## 2019-03-18 PROCEDURE — 36600 WITHDRAWAL OF ARTERIAL BLOOD: CPT

## 2019-03-18 PROCEDURE — 87040 BLOOD CULTURE FOR BACTERIA: CPT | Mod: 59

## 2019-03-18 PROCEDURE — 85025 COMPLETE CBC W/AUTO DIFF WBC: CPT

## 2019-03-18 PROCEDURE — 99900035 HC TECH TIME PER 15 MIN (STAT)

## 2019-03-18 PROCEDURE — 96366 THER/PROPH/DIAG IV INF ADDON: CPT | Mod: 59

## 2019-03-18 PROCEDURE — 99291 CRITICAL CARE FIRST HOUR: CPT | Mod: 25

## 2019-03-18 PROCEDURE — 84145 PROCALCITONIN (PCT): CPT

## 2019-03-18 PROCEDURE — 82803 BLOOD GASES ANY COMBINATION: CPT

## 2019-03-18 PROCEDURE — 86140 C-REACTIVE PROTEIN: CPT

## 2019-03-18 RX ORDER — FUROSEMIDE 10 MG/ML
80 INJECTION INTRAMUSCULAR; INTRAVENOUS
Status: COMPLETED | OUTPATIENT
Start: 2019-03-18 | End: 2019-03-18

## 2019-03-18 RX ORDER — ALBUTEROL SULFATE 2.5 MG/.5ML
2.5 SOLUTION RESPIRATORY (INHALATION)
Status: DISCONTINUED | OUTPATIENT
Start: 2019-03-18 | End: 2019-03-19

## 2019-03-18 RX ORDER — OSELTAMIVIR PHOSPHATE 75 MG/1
75 CAPSULE ORAL
Status: DISCONTINUED | OUTPATIENT
Start: 2019-03-18 | End: 2019-03-19

## 2019-03-18 RX ORDER — METHYLPREDNISOLONE SOD SUCC 125 MG
125 VIAL (EA) INJECTION
Status: COMPLETED | OUTPATIENT
Start: 2019-03-18 | End: 2019-03-18

## 2019-03-18 RX ADMIN — FUROSEMIDE 80 MG: 10 INJECTION, SOLUTION INTRAMUSCULAR; INTRAVENOUS at 10:03

## 2019-03-18 RX ADMIN — METHYLPREDNISOLONE SODIUM SUCCINATE 125 MG: 125 INJECTION, POWDER, FOR SOLUTION INTRAMUSCULAR; INTRAVENOUS at 07:03

## 2019-03-18 RX ADMIN — FUROSEMIDE 80 MG: 10 INJECTION, SOLUTION INTRAMUSCULAR; INTRAVENOUS at 07:03

## 2019-03-18 NOTE — ED PROVIDER NOTES
Encounter Date: 3/18/2019    SCRIBE #1 NOTE: I, Fabienne Chaidez, am scribing for, and in the presence of,  Dr. Pollock. I have scribed the entire note.       History     Chief Complaint   Patient presents with    Shortness of Breath     Increase SOB over the last two days.  Presents on CPAP with duo neb administered.       The patient is a 78 y.o. female with co-morbidities including: COPD, asthma, dementia, hypertension, diabetes mellitus type 2, who presents to the ED via EMS, with a complaint of shortness of breath over the last 2 days. The patient arrives on CPAP with duo nebs administered. Shortness of breath has been worsening for the past 2 days. Bilateral wheezing. Per EMS, patient was administered 2.5 mg of albuterol. Patient improved from 82% to 96% from CPAP, however no relief of wheezing.       The history is provided by the patient and medical records.     Review of patient's allergies indicates:  No Known Allergies  Past Medical History:   Diagnosis Date    Anemia     Anxiety     Asthma     Constipation     COPD (chronic obstructive pulmonary disease)     Dementia     Dependence on supplemental oxygen     Diabetes mellitus type 2 in obese     Difficulty in walking     Edema     Hypertension     Major depression, single episode     Risk for falls     Thrombocytopenia     Urinary tract infection     Vaginal infection      History reviewed. No pertinent surgical history.  Family History   Problem Relation Age of Onset    Kidney disease Neg Hx     Prostate cancer Neg Hx      Social History     Tobacco Use    Smoking status: Former Smoker     Packs/day: 1.00     Types: Cigarettes     Last attempt to quit: 2012     Years since quittin.7    Smokeless tobacco: Never Used   Substance Use Topics    Alcohol use: No    Drug use: No     Review of Systems   Constitutional: Negative for fever.   HENT: Negative for sore throat.    Respiratory: Positive for shortness of breath and  wheezing.    Cardiovascular: Negative for chest pain.   Gastrointestinal: Negative for nausea.   Genitourinary: Negative for dysuria.   Musculoskeletal: Negative for back pain.   Skin: Negative for rash.   Neurological: Negative for weakness.   Hematological: Does not bruise/bleed easily.       Physical Exam     Initial Vitals [03/18/19 1812]   BP Pulse Resp Temp SpO2   (!) 148/98 99 16 98 °F (36.7 °C) 97 %      MAP       --         Physical Exam    Nursing note and vitals reviewed.  Constitutional: She appears well-developed and well-nourished. She appears ill. She appears distressed.   Elderly, frail, ill appearing. On CPAP.    HENT:   Head: Normocephalic and atraumatic.   Mouth/Throat: Oropharynx is clear and moist.   Eyes: Conjunctivae are normal.   Neck: Normal range of motion.   Cardiovascular: Normal rate, regular rhythm and normal heart sounds.   Pulmonary/Chest: Tachypnea noted. She is in respiratory distress. She has decreased breath sounds. She has wheezes.   On CPAP. Tachypneic in the 30's. Diminished breath sounds with minimal air movement, occasional expiratory wheezing.    Abdominal: Soft. She exhibits no distension. There is no tenderness.   Musculoskeletal: Normal range of motion.   Neurological: She is alert and oriented to person, place, and time.   Skin: Skin is warm and dry.         ED Course   Procedures  Labs Reviewed   CBC W/ AUTO DIFFERENTIAL - Abnormal; Notable for the following components:       Result Value    WBC 15.33 (*)     MCH 26.7 (*)     MCHC 31.1 (*)     RDW 15.1 (*)     Immature Granulocytes 1.1 (*)     Gran # (ANC) 13.9 (*)     Immature Grans (Abs) 0.17 (*)     Lymph # 0.7 (*)     Gran% 90.8 (*)     Lymph% 4.4 (*)     Mono% 3.5 (*)     All other components within normal limits   COMPREHENSIVE METABOLIC PANEL - Abnormal; Notable for the following components:    Chloride 89 (*)     CO2 38 (*)     Glucose 129 (*)     Albumin 2.9 (*)     All other components within normal limits    B-TYPE NATRIURETIC PEPTIDE - Abnormal; Notable for the following components:    BNP 1,125 (*)     All other components within normal limits   TROPONIN I - Abnormal; Notable for the following components:    Troponin I 0.330 (*)     All other components within normal limits   TROPONIN I - Abnormal; Notable for the following components:    Troponin I 0.281 (*)     All other components within normal limits   CBC W/ AUTO DIFFERENTIAL - Abnormal; Notable for the following components:    WBC 15.31 (*)     MCH 26.4 (*)     MCHC 30.7 (*)     RDW 15.0 (*)     Immature Granulocytes 1.2 (*)     Gran # (ANC) 14.4 (*)     Immature Grans (Abs) 0.18 (*)     Lymph # 0.6 (*)     Mono # 0.1 (*)     Gran% 93.8 (*)     Lymph% 4.2 (*)     Mono% 0.7 (*)     All other components within normal limits   COMPREHENSIVE METABOLIC PANEL - Abnormal; Notable for the following components:    Chloride 83 (*)     CO2 40 (*)     Glucose 148 (*)     Albumin 2.9 (*)     All other components within normal limits   C-REACTIVE PROTEIN - Abnormal; Notable for the following components:    .0 (*)     All other components within normal limits    Narrative:     add on crp [id number] per dr. bliss 03/19/2019  01:53 add on per Dr Bliss order [id number] and #[id number] 03/19/2019  02:12 GHGB, PCAL   PROCALCITONIN - Abnormal; Notable for the following components:    Procalcitonin 0.48 (*)     All other components within normal limits    Narrative:     add on crp [id number] per dr. bliss 03/19/2019  01:53 add on per Dr Bliss order [id number] and #[id number] 03/19/2019  02:12 GHGB, PCAL   ISTAT PROCEDURE - Abnormal; Notable for the following components:    POC PCO2 71.3 (*)     POC HCO3 42.8 (*)     POC TCO2 45 (*)     All other components within normal limits   ISTAT PROCEDURE - Abnormal; Notable for the following components:    POC PCO2 71.3 (*)     POC HCO3 42.8 (*)     POC TCO2 45 (*)     All other components within normal limits   ISTAT PROCEDURE  - Abnormal; Notable for the following components:    POC PH 7.461 (*)     POC PCO2 72.7 (*)     POC HCO3 51.8 (*)     POC TCO2 >50 (*)     All other components within normal limits   CULTURE, RESPIRATORY   INFLUENZA A & B BY MOLECULAR   LACTIC ACID, PLASMA   PROCALCITONIN   HEMOGLOBIN A1C   C-REACTIVE PROTEIN   MAGNESIUM   PROTIME-INR   HEMOGLOBIN A1C    Narrative:     add on crp [id number] per dr. bliss 03/19/2019  01:53 add on per Dr Bliss order [id number] and #[id number] 03/19/2019  02:12 GHGB, PCAL     EKG Readings: (Independently Interpreted)   Initial Reading: No STEMI. Rhythm: Normal Sinus Rhythm. Heart Rate: 92. Ectopy: No Ectopy.     ECG Results          Repeat EKG 12-lead (Final result)  Result time 03/19/19 12:54:55    Final result by Interface, [facility] (03/19/19 12:54:55)                 Narrative:    Test Reason : (Not Selected)    Vent. Rate : 092 BPM     Atrial Rate : 092 BPM     P-R Int : 142 ms          QRS Dur : 082 ms      QT Int : 372 ms       P-R-T Axes : 069 029 073 degrees     QTc Int : 460 ms    Normal sinus rhythm  Normal ECG  When compared with ECG of 13-MAR-2019 10:54,  Possible Inferior infarct No longer present  Confirmed by BETINA TREJO MD ([id number]) on 3/19/2019 12:54:45 PM    Referred By: [email or web address]   SELF           Confirmed By:BETINA TREJO MD                            Imaging Results          X-Ray Chest 1 View (Final result)  Result time 03/18/19 19:40:06    Final result by Antonio Pearl MD (03/18/19 19:40:06)                 Impression:      As above.      Electronically signed by: Antonio Pearl MD  Date:    03/18/2019  Time:    19:40             Narrative:    EXAMINATION:  XR CHEST 1 VIEW    CLINICAL HISTORY:  Shortness of breath    TECHNIQUE:  Single frontal view of the chest was performed.    COMPARISON:  Chest radiograph 1 day prior    FINDINGS:  Monitoring leads overlie the chest.  Large body habitus.  Patient is rotated.    Cardiomediastinal silhouette remains  prominent similar to prior with continued prominence of the central pulmonary vasculature and bilateral diffuse interstitial coarsening suggesting pulmonary edema/CHF pattern.  Cannot exclude trace bilateral pleural effusions.  Slightly decreased prominence of the right hilar region.  No large pneumothorax or definite new focal opacity.  No acute osseous process seen.                              X-Rays:   Independently Interpreted Readings:   Chest X-Ray: Cardiomegaly present.  Increased vascular markings consistent with CHF are present.     Medical Decision Making:   History:   Old Medical Records: I decided to obtain old medical records.  Initial Assessment:   Emergent evaluation of shortness of breath  Differential Diagnosis:   COPD exacerbation, pneumonia, ACS  Clinical Tests:   Lab Tests: Ordered and Reviewed  Radiological Study: Ordered and Reviewed  ED Management:  This is the patient's 3rd evaluation in the emergency department this week for the same complaint.  At this point she appears significantly ill, and is in respiratory distress.  She has been transitioned to BiPAP.  Will check labs, give breathing treatment, get ABG.  Anticipate admission at this time.  Other:   I have discussed this case with another health care provider.       <> Summary of the Discussion: Cardiology            Scribe Attestation:   Scribe #1: I performed the above scribed service and the documentation accurately describes the services I performed. I attest to the accuracy of the note.    Attending Attestation:         Attending Critical Care:   Critical Care Times:   Direct Patient Care (initial evaluation, reassessments, and time considering the case)................................................................30 minutes.   Additional History from reviewing old medical records or taking additional history from the family, EMS, PCP, etc.......................10 minutes.   Ordering, Reviewing, and Interpreting Diagnostic  Studies...............................................................................................................5 minutes.   Documentation..................................................................................................................................................................................5 minutes.   Consultation with other Physicians. .................................................................................................................................................10 minutes.   ==============================================================  · Total Critical Care Time - exclusive of procedural time: 60 minutes.  ==============================================================  Critical care was necessary to treat or prevent imminent or life-threatening deterioration of the following conditions: congestive heart failure.       Attending ED Notes:   Labs reviewed.  The patient has elevated troponin, elevated BNP.  I reviewed the medical records, 4 days ago she did not have these changes in cardiac enzymes.  Last echo was 2 years ago this was unremarkable, normal EF, mild diastolic dysfunction.  At this time the daughter is at the bedside and states that the patient was diagnosed with influenza at the 1st visit this week.  It is unknown if she is on Tamiflu.  Will give a dose of Tamiflu at this time.  I am concerned for new onset heart failure, I am concerned for possible myocarditis in the setting of an influenza infection.  Will continue BiPAP.  Will give IV Lasix.  Cardiology consulted.    9:42 PM  After diuresed about 700 ml of urine, the patient should not come off BiPAP at this time not the best for patient. Medical ICU recommend cardiology to come in to evaluate the patient.  Cardiology will admit to their ICU service.             Clinical Impression:       ICD-10-CM ICD-9-CM   1. Elevated troponin R74.8 790.6   2. Shortness of breath R06.02 786.05   3. Heart failure  I50.9 428.9         Disposition:   Disposition: Admitted  Condition: Serious                        Trav Pollock MD  03/20/19 1539

## 2019-03-18 NOTE — ED TRIAGE NOTES
Pt arrived to ed VIA ems with CC of SOB, bilateral wheezing x 1 day. EMS states pt given 2.5 mg of albuterol trx, and duoneb, with CPAP used oxygen improved from 82%  On RA to 96 % with no improvement of bilateral lower lobes wheezing.     Patient identifiers verified and correct for Laura Negron.    LOC: The patient is awake, alert and oriented x 4. Pt is speaking appropriately, no slurred speech.  APPEARANCE: Patient resting comfortably . Pt is clean and well groomed. No JVD visible. Pt reports pain level of 0.  SKIN: Skin is warm dry and intact, and color is consistent with ethnicity. No tenting observed and capillary refill <3 seconds. No clubbing noted to nail beds. No breakdown or brusing visible and mucus membranes moist and acyanotic.  MUSCULOSKELETAL: Full range of motion present in all extremities. Hand  equal +5 bilaterally.  RESPIRATORY: Airway is open and patent. Respirations-labored, tachypnia, equal bilaterally on inspiration and expiration. Accessory muscle use noted. Bilateral posterior lower lobes wheezing to auscultation. MD and respiratory therapist at bedside with CPAP in progress, pt. Tolerated well.   CARDIAC: Patient has regular heart rate and rhythm.  Non pitting peripheral edema noted, and patient has no c/o chest pain.  ABDOMEN: Soft and non-tender to palpation with no distention noted. Normoactive bowel sounds X4 quadrants. Pt has no complaints of abnormal bowel movements. Pt reports normal appetite.   NEUROLOGIC: Eyes open spontaneously and facial expression symmetrical. Pt behavior appropriate to situation, and pt follows commands.  Pt reports sensation present in all extremities when touched with a finger. PERRLA  : No complaints of frequency, burning, urgency or blood in the urine.

## 2019-03-19 PROBLEM — Z71.89 GOALS OF CARE, COUNSELING/DISCUSSION: Status: ACTIVE | Noted: 2019-03-19

## 2019-03-19 PROBLEM — J96.21 ACUTE ON CHRONIC RESPIRATORY FAILURE WITH HYPOXIA AND HYPERCAPNIA: Status: ACTIVE | Noted: 2019-03-19

## 2019-03-19 PROBLEM — I50.33 ACUTE ON CHRONIC DIASTOLIC CONGESTIVE HEART FAILURE: Status: ACTIVE | Noted: 2019-03-18

## 2019-03-19 PROBLEM — G93.41 ENCEPHALOPATHY, METABOLIC: Status: ACTIVE | Noted: 2019-03-19

## 2019-03-19 PROBLEM — I24.89 DEMAND ISCHEMIA: Status: ACTIVE | Noted: 2019-03-18

## 2019-03-19 PROBLEM — J96.22 ACUTE ON CHRONIC RESPIRATORY FAILURE WITH HYPOXIA AND HYPERCAPNIA: Status: ACTIVE | Noted: 2019-03-19

## 2019-03-19 LAB
ALBUMIN SERPL BCP-MCNC: 2.9 G/DL
ALLENS TEST: ABNORMAL
ALLENS TEST: ABNORMAL
ALP SERPL-CCNC: 90 U/L
ALT SERPL W/O P-5'-P-CCNC: 11 U/L
ANION GAP SERPL CALC-SCNC: 14 MMOL/L
ASCENDING AORTA: 3.34 CM
AST SERPL-CCNC: 26 U/L
AV INDEX (PROSTH): 0.93
AV MEAN GRADIENT: 4.9 MMHG
AV PEAK GRADIENT: 8.29 MMHG
AV VALVE AREA: 3.19 CM2
AV VELOCITY RATIO: 0.78
BACTERIA SPEC AEROBE CULT: NORMAL
BASOPHILS # BLD AUTO: 0.02 K/UL
BASOPHILS NFR BLD: 0.1 %
BILIRUB SERPL-MCNC: 0.8 MG/DL
BSA FOR ECHO PROCEDURE: 2.03 M2
BUN SERPL-MCNC: 11 MG/DL
CALCIUM SERPL-MCNC: 9.1 MG/DL
CHLORIDE SERPL-SCNC: 83 MMOL/L
CO2 SERPL-SCNC: 40 MMOL/L
CREAT SERPL-MCNC: 0.6 MG/DL
CRP SERPL-MCNC: 307 MG/L
CV ECHO LV RWT: 0.64 CM
DELSYS: ABNORMAL
DELSYS: ABNORMAL
DIFFERENTIAL METHOD: ABNORMAL
DOP CALC AO PEAK VEL: 1.44 M/S
DOP CALC AO VTI: 26.92 CM
DOP CALC LVOT AREA: 3.43 CM2
DOP CALC LVOT DIAMETER: 2.09 CM
DOP CALC LVOT PEAK VEL: 1.13 M/S
DOP CALC LVOT STROKE VOLUME: 85.79 CM3
DOP CALCLVOT PEAK VEL VTI: 25.02 CM
ECHO LV POSTERIOR WALL: 1.12 CM (ref 0.6–1.1)
EOSINOPHIL # BLD AUTO: 0 K/UL
EOSINOPHIL NFR BLD: 0 %
EP: 5
EP: 5
ERYTHROCYTE [DISTWIDTH] IN BLOOD BY AUTOMATED COUNT: 15 %
ERYTHROCYTE [SEDIMENTATION RATE] IN BLOOD BY WESTERGREN METHOD: 10 MM/H
ERYTHROCYTE [SEDIMENTATION RATE] IN BLOOD BY WESTERGREN METHOD: 10 MM/H
EST. GFR  (AFRICAN AMERICAN): >60 ML/MIN/1.73 M^2
EST. GFR  (NON AFRICAN AMERICAN): >60 ML/MIN/1.73 M^2
ESTIMATED AVG GLUCOSE: 97 MG/DL
FIO2: 40
FIO2: 40
FRACTIONAL SHORTENING: 23 % (ref 28–44)
GLUCOSE SERPL-MCNC: 148 MG/DL
GRAM STN SPEC: NORMAL
GRAM STN SPEC: NORMAL
HBA1C MFR BLD HPLC: 5 %
HCO3 UR-SCNC: 42.8 MMOL/L (ref 24–28)
HCO3 UR-SCNC: 51.8 MMOL/L (ref 24–28)
HCT VFR BLD AUTO: 40.7 %
HGB BLD-MCNC: 12.5 G/DL
IMM GRANULOCYTES # BLD AUTO: 0.18 K/UL
IMM GRANULOCYTES NFR BLD AUTO: 1.2 %
INFLUENZA A, MOLECULAR: NEGATIVE
INFLUENZA B, MOLECULAR: NEGATIVE
INR PPP: 1.1
INTERVENTRICULAR SEPTUM: 1.13 CM (ref 0.6–1.1)
IP: 10
IP: 13
LA MAJOR: 6.18 CM
LA MINOR: 6.29 CM
LA WIDTH: 3.49 CM
LEFT ATRIUM SIZE: 3.35 CM
LEFT ATRIUM VOLUME INDEX: 31.3 ML/M2
LEFT ATRIUM VOLUME: 61.96 CM3
LEFT INTERNAL DIMENSION IN SYSTOLE: 2.68 CM (ref 2.1–4)
LEFT VENTRICLE DIASTOLIC VOLUME INDEX: 25.31 ML/M2
LEFT VENTRICLE DIASTOLIC VOLUME: 50.08 ML
LEFT VENTRICLE MASS INDEX: 61.7 G/M2
LEFT VENTRICLE SYSTOLIC VOLUME INDEX: 13.4 ML/M2
LEFT VENTRICLE SYSTOLIC VOLUME: 26.42 ML
LEFT VENTRICULAR INTERNAL DIMENSION IN DIASTOLE: 3.48 CM (ref 3.5–6)
LEFT VENTRICULAR MASS: 122.06 G
LYMPHOCYTES # BLD AUTO: 0.6 K/UL
LYMPHOCYTES NFR BLD: 4.2 %
MAGNESIUM SERPL-MCNC: 1.8 MG/DL
MCH RBC QN AUTO: 26.4 PG
MCHC RBC AUTO-ENTMCNC: 30.7 G/DL
MCV RBC AUTO: 86 FL
MIN VOL: 11.7
MIN VOL: 11.9
MODE: ABNORMAL
MODE: ABNORMAL
MONOCYTES # BLD AUTO: 0.1 K/UL
MONOCYTES NFR BLD: 0.7 %
NEUTROPHILS # BLD AUTO: 14.4 K/UL
NEUTROPHILS NFR BLD: 93.8 %
NRBC BLD-RTO: 0 /100 WBC
PCO2 BLDA: 71.3 MMHG (ref 35–45)
PCO2 BLDA: 72.7 MMHG (ref 35–45)
PH SMN: 7.39 [PH] (ref 7.35–7.45)
PH SMN: 7.46 [PH] (ref 7.35–7.45)
PLATELET # BLD AUTO: 250 K/UL
PMV BLD AUTO: 10.5 FL
PO2 BLDA: 80 MMHG (ref 80–100)
PO2 BLDA: 92 MMHG (ref 80–100)
POC BE: 18 MMOL/L
POC BE: 28 MMOL/L
POC SATURATED O2: 96 % (ref 95–100)
POC SATURATED O2: 97 % (ref 95–100)
POC TCO2: 45 MMOL/L (ref 23–27)
POC TCO2: >50 MMOL/L (ref 23–27)
POTASSIUM SERPL-SCNC: 3.5 MMOL/L
PROCALCITONIN SERPL IA-MCNC: 0.48 NG/ML
PROT SERPL-MCNC: 6.8 G/DL
PROTHROMBIN TIME: 11.4 SEC
RA MAJOR: 5.4 CM
RA PRESSURE: 3 MMHG
RA WIDTH: 2.75 CM
RBC # BLD AUTO: 4.74 M/UL
RIGHT VENTRICULAR END-DIASTOLIC DIMENSION: 2.82 CM
SAMPLE: ABNORMAL
SAMPLE: ABNORMAL
SINUS: 3.61 CM
SITE: ABNORMAL
SITE: ABNORMAL
SODIUM SERPL-SCNC: 137 MMOL/L
SP02: 100
SP02: 96
SPECIMEN SOURCE: NORMAL
SPONT RATE: 20
SPONT RATE: 24
STJ: 3.18 CM
TDI LATERAL: 0.08
TDI SEPTAL: 0.07
TDI: 0.08
TRICUSPID ANNULAR PLANE SYSTOLIC EXCURSION: 2.26 CM
TROPONIN I SERPL DL<=0.01 NG/ML-MCNC: 0.28 NG/ML
WBC # BLD AUTO: 15.31 K/UL

## 2019-03-19 PROCEDURE — 82803 BLOOD GASES ANY COMBINATION: CPT

## 2019-03-19 PROCEDURE — 87070 CULTURE OTHR SPECIMN AEROBIC: CPT | Mod: 59

## 2019-03-19 PROCEDURE — 99233 SBSQ HOSP IP/OBS HIGH 50: CPT | Mod: GC,,, | Performed by: INTERNAL MEDICINE

## 2019-03-19 PROCEDURE — 99233 PR SUBSEQUENT HOSPITAL CARE,LEVL III: ICD-10-PCS | Mod: GC,,, | Performed by: INTERNAL MEDICINE

## 2019-03-19 PROCEDURE — 63600175 PHARM REV CODE 636 W HCPCS: Performed by: HOSPITALIST

## 2019-03-19 PROCEDURE — 85025 COMPLETE CBC W/AUTO DIFF WBC: CPT

## 2019-03-19 PROCEDURE — 11000001 HC ACUTE MED/SURG PRIVATE ROOM

## 2019-03-19 PROCEDURE — 83735 ASSAY OF MAGNESIUM: CPT

## 2019-03-19 PROCEDURE — 99900035 HC TECH TIME PER 15 MIN (STAT)

## 2019-03-19 PROCEDURE — 80053 COMPREHEN METABOLIC PANEL: CPT

## 2019-03-19 PROCEDURE — 87205 SMEAR GRAM STAIN: CPT | Mod: 59

## 2019-03-19 PROCEDURE — 85610 PROTHROMBIN TIME: CPT

## 2019-03-19 PROCEDURE — 94761 N-INVAS EAR/PLS OXIMETRY MLT: CPT

## 2019-03-19 PROCEDURE — 25000003 PHARM REV CODE 250: Performed by: HOSPITALIST

## 2019-03-19 PROCEDURE — 27000221 HC OXYGEN, UP TO 24 HOURS

## 2019-03-19 PROCEDURE — 27100092 HC HIGH FLOW DELIVERY CANNULA

## 2019-03-19 PROCEDURE — 84484 ASSAY OF TROPONIN QUANT: CPT

## 2019-03-19 PROCEDURE — 87502 INFLUENZA DNA AMP PROBE: CPT

## 2019-03-19 PROCEDURE — 94640 AIRWAY INHALATION TREATMENT: CPT

## 2019-03-19 PROCEDURE — 99223 1ST HOSP IP/OBS HIGH 75: CPT | Mod: ,,, | Performed by: HOSPITALIST

## 2019-03-19 PROCEDURE — 25000242 PHARM REV CODE 250 ALT 637 W/ HCPCS: Performed by: HOSPITALIST

## 2019-03-19 PROCEDURE — 99223 PR INITIAL HOSPITAL CARE,LEVL III: ICD-10-PCS | Mod: ,,, | Performed by: HOSPITALIST

## 2019-03-19 PROCEDURE — 36600 WITHDRAWAL OF ARTERIAL BLOOD: CPT

## 2019-03-19 PROCEDURE — 27100171 HC OXYGEN HIGH FLOW UP TO 24 HOURS

## 2019-03-19 PROCEDURE — 94660 CPAP INITIATION&MGMT: CPT

## 2019-03-19 RX ORDER — SODIUM CHLORIDE 0.9 % (FLUSH) 0.9 %
5 SYRINGE (ML) INJECTION
Status: DISCONTINUED | OUTPATIENT
Start: 2019-03-19 | End: 2019-03-25 | Stop reason: HOSPADM

## 2019-03-19 RX ORDER — CEFEPIME HYDROCHLORIDE 2 G/1
2 INJECTION, POWDER, FOR SOLUTION INTRAVENOUS
Status: DISCONTINUED | OUTPATIENT
Start: 2019-03-19 | End: 2019-03-20

## 2019-03-19 RX ORDER — ASPIRIN 81 MG/1
81 TABLET ORAL DAILY
Status: DISCONTINUED | OUTPATIENT
Start: 2019-03-19 | End: 2019-03-25 | Stop reason: HOSPADM

## 2019-03-19 RX ORDER — SODIUM CHLORIDE 0.9 % (FLUSH) 0.9 %
5 SYRINGE (ML) INJECTION
Status: DISCONTINUED | OUTPATIENT
Start: 2019-03-19 | End: 2019-03-19

## 2019-03-19 RX ORDER — IPRATROPIUM BROMIDE AND ALBUTEROL SULFATE 2.5; .5 MG/3ML; MG/3ML
3 SOLUTION RESPIRATORY (INHALATION) EVERY 4 HOURS
Status: DISCONTINUED | OUTPATIENT
Start: 2019-03-19 | End: 2019-03-25 | Stop reason: HOSPADM

## 2019-03-19 RX ORDER — MAGNESIUM SULFATE HEPTAHYDRATE 40 MG/ML
2 INJECTION, SOLUTION INTRAVENOUS ONCE
Status: COMPLETED | OUTPATIENT
Start: 2019-03-19 | End: 2019-03-19

## 2019-03-19 RX ORDER — IBUPROFEN 200 MG
16 TABLET ORAL
Status: DISCONTINUED | OUTPATIENT
Start: 2019-03-19 | End: 2019-03-25 | Stop reason: HOSPADM

## 2019-03-19 RX ORDER — VANCOMYCIN HCL IN 5 % DEXTROSE 1.25 G/25
1250 PLASTIC BAG, INJECTION (ML) INTRAVENOUS
Status: DISCONTINUED | OUTPATIENT
Start: 2019-03-19 | End: 2019-03-20

## 2019-03-19 RX ORDER — ESCITALOPRAM OXALATE 20 MG/1
20 TABLET ORAL DAILY
Status: DISCONTINUED | OUTPATIENT
Start: 2019-03-19 | End: 2019-03-25 | Stop reason: HOSPADM

## 2019-03-19 RX ORDER — ONDANSETRON 2 MG/ML
4 INJECTION INTRAMUSCULAR; INTRAVENOUS EVERY 8 HOURS PRN
Status: DISCONTINUED | OUTPATIENT
Start: 2019-03-19 | End: 2019-03-25 | Stop reason: HOSPADM

## 2019-03-19 RX ORDER — VANCOMYCIN 2 GRAM/500 ML IN 0.9 % SODIUM CHLORIDE INTRAVENOUS
2000 ONCE
Status: COMPLETED | OUTPATIENT
Start: 2019-03-19 | End: 2019-03-19

## 2019-03-19 RX ORDER — GLUCAGON 1 MG
1 KIT INJECTION
Status: DISCONTINUED | OUTPATIENT
Start: 2019-03-19 | End: 2019-03-25 | Stop reason: HOSPADM

## 2019-03-19 RX ORDER — INSULIN ASPART 100 [IU]/ML
0-5 INJECTION, SOLUTION INTRAVENOUS; SUBCUTANEOUS
Status: DISCONTINUED | OUTPATIENT
Start: 2019-03-19 | End: 2019-03-25 | Stop reason: HOSPADM

## 2019-03-19 RX ORDER — OSELTAMIVIR PHOSPHATE 75 MG/1
75 CAPSULE ORAL 2 TIMES DAILY
Status: COMPLETED | OUTPATIENT
Start: 2019-03-19 | End: 2019-03-21

## 2019-03-19 RX ORDER — DOCUSATE SODIUM 100 MG/1
100 CAPSULE, LIQUID FILLED ORAL 2 TIMES DAILY
Status: DISCONTINUED | OUTPATIENT
Start: 2019-03-19 | End: 2019-03-22

## 2019-03-19 RX ORDER — GUAIFENESIN 600 MG/1
600 TABLET, EXTENDED RELEASE ORAL 2 TIMES DAILY
Status: DISCONTINUED | OUTPATIENT
Start: 2019-03-19 | End: 2019-03-20

## 2019-03-19 RX ORDER — METHYLPREDNISOLONE SOD SUCC 125 MG
60 VIAL (EA) INJECTION 2 TIMES DAILY
Status: DISCONTINUED | OUTPATIENT
Start: 2019-03-19 | End: 2019-03-20

## 2019-03-19 RX ORDER — IBUPROFEN 200 MG
24 TABLET ORAL
Status: DISCONTINUED | OUTPATIENT
Start: 2019-03-19 | End: 2019-03-25 | Stop reason: HOSPADM

## 2019-03-19 RX ADMIN — VANCOMYCIN HYDROCHLORIDE 2000 MG: 10 INJECTION, POWDER, LYOPHILIZED, FOR SOLUTION INTRAVENOUS at 01:03

## 2019-03-19 RX ADMIN — METHYLPREDNISOLONE SODIUM SUCCINATE 60 MG: 125 INJECTION, POWDER, FOR SOLUTION INTRAMUSCULAR; INTRAVENOUS at 10:03

## 2019-03-19 RX ADMIN — METHYLPREDNISOLONE SODIUM SUCCINATE 60 MG: 125 INJECTION, POWDER, FOR SOLUTION INTRAMUSCULAR; INTRAVENOUS at 09:03

## 2019-03-19 RX ADMIN — DOCUSATE SODIUM 100 MG: 100 CAPSULE, LIQUID FILLED ORAL at 09:03

## 2019-03-19 RX ADMIN — CEFEPIME 2 G: 2 INJECTION, POWDER, FOR SOLUTION INTRAVENOUS at 10:03

## 2019-03-19 RX ADMIN — OSELTAMIVIR PHOSPHATE 75 MG: 75 CAPSULE ORAL at 09:03

## 2019-03-19 RX ADMIN — IPRATROPIUM BROMIDE AND ALBUTEROL SULFATE 3 ML: .5; 3 SOLUTION RESPIRATORY (INHALATION) at 04:03

## 2019-03-19 RX ADMIN — GUAIFENESIN 600 MG: 600 TABLET, EXTENDED RELEASE ORAL at 10:03

## 2019-03-19 RX ADMIN — IPRATROPIUM BROMIDE AND ALBUTEROL SULFATE 3 ML: .5; 3 SOLUTION RESPIRATORY (INHALATION) at 07:03

## 2019-03-19 RX ADMIN — IPRATROPIUM BROMIDE AND ALBUTEROL SULFATE 3 ML: .5; 3 SOLUTION RESPIRATORY (INHALATION) at 12:03

## 2019-03-19 RX ADMIN — ASPIRIN 81 MG: 81 TABLET, COATED ORAL at 09:03

## 2019-03-19 RX ADMIN — OSELTAMIVIR PHOSPHATE 75 MG: 75 CAPSULE ORAL at 11:03

## 2019-03-19 RX ADMIN — DOCUSATE SODIUM 100 MG: 100 CAPSULE, LIQUID FILLED ORAL at 10:03

## 2019-03-19 RX ADMIN — VANCOMYCIN HYDROCHLORIDE 1250 MG: 100 INJECTION, POWDER, LYOPHILIZED, FOR SOLUTION INTRAVENOUS at 12:03

## 2019-03-19 RX ADMIN — ESCITALOPRAM OXALATE 20 MG: 5 TABLET, FILM COATED ORAL at 09:03

## 2019-03-19 RX ADMIN — CEFEPIME 2 G: 2 INJECTION, POWDER, FOR SOLUTION INTRAVENOUS at 01:03

## 2019-03-19 RX ADMIN — MAGNESIUM SULFATE HEPTAHYDRATE 2 G: 40 INJECTION, SOLUTION INTRAVENOUS at 09:03

## 2019-03-19 RX ADMIN — IPRATROPIUM BROMIDE AND ALBUTEROL SULFATE 3 ML: .5; 3 SOLUTION RESPIRATORY (INHALATION) at 05:03

## 2019-03-19 NOTE — HPI
Ms. Negron is a 78 yoF, cardiology consulted for recurretn HFpEF and COPD exacerbation admission. PMHx of severe dementia, COPD with Asthma on HOT 2 - 3 L, Hypertension, HFpEF, DM type II, who is a [facility] resident since 2015. At the time of my assessment patient on bipap, at baseline severely dementia, not able to provide HPI, further history gathered from chart review, and discussing with daughter at bedside. Patient has worsening SOB for the past 2 days, EMS activated noted she was hypoxic, given albuterol treatment and placed on Bipap with improvement. Daughter states for the past 1 month patient has had recently URI symptoms with profound wheezing, SOB, and LE edema which has required 4 admission in the past month. Most recently 2x last week, she was seen at the [facility], on her last visit state they were unable to manage her any further subsequently brought her to [facility]. Daughter states over the last year, patient has progressively declined, now completely wheelchair bound and 100% dependent with all of her ADLs, additionally recently noted to have gurgling / choking with po intake. She is complete with her PTA medications, (Lasix 20mg Qday, prednisone taper, Lisinopril / Toprol XL).     At length discussion with daughter, with patients declining functional status and poor QOL additionally recurrent hospital admission, older daughter stated she would not want any aggressive measures for her mother, she would not want any chest compression, intubation, DCCV, pressor support. However would further like to discuss with her siblings ( 2x brothers / 1x sister) prior to making decision.     While in ED given Lasix 80mg x1 with 1.4L clear UOP, started on Lasix 10mg ggt/hr, currently on bipap.     WBC 15K  Cr 09.6   Tbili 0.8, LfTS wnl   BNP 1125   TTE 3/20/17 EF 55%, mild MR / TR   NM 17: negative     AB.13 / o2 92 / co2 71.3 / hco3 43.8

## 2019-03-19 NOTE — ED NOTES
Family left bedside. Need to get rest. Asked to be notified if cardiology comes as well as any change in POC. Please Call Rosemary, daughter at [phone] for any updates.

## 2019-03-19 NOTE — ASSESSMENT & PLAN NOTE
-See respiratory failure  -Hold home metoprolol and lisinopril, if stable and improving in AM can consider restarting

## 2019-03-19 NOTE — HPI
79 y/o with COPD on 2L home O2, HFpEF, HTN, dementia (baseline waxing and waning orientation to family and place, never year), DM2 who presents from [facility] where she has been since 2015 with chief complaint of dyspnea. Per her daughter at bedside who provides history given respiratory distress of patient she had recent URI sx's for the past few weeks for which she presented to [facility] 03/13-03/16 and admitted to ICU for respiratory failure thought to be 2/2 COPD exacerbation from influenza A. She was discharged on oral steroids and tamiflu. Per family she again worsened at nursing center for past 2 days and presented to ER there and although notes are not complete was given albuterol and had a chest x-ray showing a right perihilar opacity cannot exclude underlying mass lesion recommend further evaluation with chest CT. This is chronic and has been read as fullness previously. Also showed interstitial pulmonary vascular congestion. Her daughter reports [facility] called her the next day and reported she continued to decline so was transferred here for management. Her daughter also reports patient has progressively declined, now completely wheelchair bound and 100% dependent with all of her ADLs, additionally recently noted to have gurgling / choking with po intake. Her daughter is unaware of her medication list.     Initially put on lasix gtt with plans for CCU admission but GOC discussion with CCU fellow was notable for her stated she would not want any aggressive measures for her mother, she would not want any chest compression, intubation, DCCV, pressor support. However would further like to discuss with her siblings ( 2x brothers / 1x sister) prior to making decision. I had a similar discussion where she states she does not think her mother would want that based on previous things she has said but had never explicitly said it so she wanted to discuss with family.

## 2019-03-19 NOTE — CONSULTS
[facility]  Cardiology  Consult Note    Patient Name: Laura Negron  MRN: [id number]  Admission Date: 3/18/2019  Hospital Length of Stay: 1 days  Code Status: Prior   Attending Provider: Trav Pollock MD   Consulting Provider: Edilberto Sneed MD  Primary Care Physician: Jorge Hair MD  Principal Problem:<principal problem not specified>    Patient information was obtained from patient and ER records.       Subjective:     HPI:   Ms. Negron is a 78 yoF, cardiology consulted for recurretn HFpEF and COPD exacerbation admission. PMHx of severe dementia, COPD with Asthma on HOT 2 - 3 L, Hypertension, HFpEF, DM type II, who is a [facility] resident since 01/2015. At the time of my assessment patient on bipap, at baseline severely dementia, not able to provide HPI, further history gathered from chart review, and discussing with daughter at bedside. Patient has worsening SOB for the past 2 days, EMS activated noted she was hypoxic, given albuterol treatment and placed on Bipap with improvement. Daughter states for the past 1 month patient has had recently URI symptoms with profound wheezing, SOB, and LE edema which has required 4 admission in the past month. Most recently 2x last week, she was seen at the [facility], on her last visit state they were unable to manage her any further subsequently brought her to [facility]. Daughter states over the last year, patient has progressively declined, now completely wheelchair bound and 100% dependent with all of her ADLs, additionally recently noted to have gurgling / choking with po intake. She is complete with her PTA medications, (Lasix 20mg Qday, prednisone taper, Lisinopril / Toprol XL).     At length discussion with daughter, with patients declining functional status and poor QOL additionally recurrent hospital admission, older daughter stated she would not want any aggressive measures for her mother, she would not  want any chest compression, intubation, DCCV, pressor support. However would further like to discuss with her siblings ( 2x brothers / 1x sister) prior to making decision.     While in ED given Lasix 80mg x1 with 1.4L clear UOP, started on Lasix 10mg ggt/hr, currently on bipap.     WBC 15K  Cr 09.6   Tbili 0.8, LfTS wnl   BNP 1125   TTE 3/20/17 EF 55%, mild MR / TR   NM 17: negative     AB.13 / o2 92 / co2 71.3 / hco3 43.8       Past Medical History:   Diagnosis Date    Anemia     Anxiety     Asthma     Constipation     COPD (chronic obstructive pulmonary disease)     Dementia     Dependence on supplemental oxygen     Diabetes mellitus type 2 in obese     Difficulty in walking     Edema     Hypertension     Major depression, single episode     Risk for falls     Thrombocytopenia     Urinary tract infection     Vaginal infection        History reviewed. No pertinent surgical history.    Review of patient's allergies indicates:  No Known Allergies    No current facility-administered medications on file prior to encounter.      Current Outpatient Medications on File Prior to Encounter   Medication Sig    acetaminophen (TYLENOL) 500 MG tablet Take 500 mg by mouth every 4 (four) hours as needed for Pain.     albuterol-ipratropium (DUO-NEB) 2.5 mg-0.5 mg/3 mL nebulizer solution Take 3 mLs by nebulization every 6 (six) hours as needed for Wheezing.    aspirin (ECOTRIN) 81 MG EC tablet Take 81 mg by mouth once daily.    clonazePAM (KLONOPIN) 0.5 MG tablet Take 1 tablet (0.5 mg total) by mouth 2 (two) times daily as needed (anxiety).    dextran 70-hypromellose (TEARS) ophthalmic solution Place 2 drops into both eyes 2 (two) times daily.    docusate sodium (COLACE) 100 MG capsule Take 1 capsule (100 mg total) by mouth 2 (two) times daily.    escitalopram oxalate (LEXAPRO) 20 MG tablet Take 20 mg by mouth once daily.    fluticasone-salmeterol 250-50 mcg/dose (ADVAIR) 250-50 mcg/dose diskus  inhaler Inhale 1 puff into the lungs 2 (two) times daily.    furosemide (LASIX) 20 MG tablet Take 1 tablet (20 mg total) by mouth once daily.    guaiFENesin (MUCINEX) 600 mg 12 hr tablet Take 1 tablet (600 mg total) by mouth 2 (two) times daily. For 3 days    lisinopril (PRINIVIL,ZESTRIL) 20 MG tablet Take 1 tablet (20 mg total) by mouth once daily.    loratadine (CLARITIN) 10 mg tablet Take 10 mg by mouth once daily.    meclizine (ANTIVERT) 32 MG tablet Take 25 mg by mouth 3 (three) times daily as needed.    melatonin 3 mg TbSR Take 2 tablets by mouth every evening.    metoprolol succinate (TOPROL-XL) 25 MG 24 hr tablet Take 25 mg by mouth every evening.     mirabegron (MYRBETRIQ) 25 mg Tb24 ER tablet Take 1 tablet (25 mg total) by mouth once daily.    multivitamin capsule Take 1 capsule by mouth once daily.    oseltamivir (TAMIFLU) 75 MG capsule Take 1 capsule (75 mg total) by mouth 2 (two) times daily. for 5 days    polyethylene glycol (GLYCOLAX) 17 gram PwPk Take 17 g by mouth once daily at 6am.     potassium chloride (KLOR-CON) 20 mEq Pack Take 20 mEq by mouth once daily.    predniSONE (DELTASONE) 10 MG tablet Take 5 tablets (50 mg total) by mouth once daily for 5 days, THEN 2.5 tablets (25 mg total) once daily for 5 days.    tiotropium (SPIRIVA) 18 mcg inhalation capsule Inhale 18 mcg into the lungs once daily.     Family History     None        Tobacco Use    Smoking status: Former Smoker     Packs/day: 1.00     Types: Cigarettes     Last attempt to quit: 2012     Years since quittin.7    Smokeless tobacco: Never Used   Substance and Sexual Activity    Alcohol use: No    Drug use: No    Sexual activity: No     Review of Systems   Unable to perform ROS: dementia     Objective:     Vital Signs (Most Recent):  Temp: 98 °F (36.7 °C) (19)  Pulse: 93 (19)  Resp: (!) 26 (19)  BP: (!) 145/85 (19)  SpO2: (!) 94 % (196) Vital Signs (24h  Range):  Temp:  [98 °F (36.7 °C)] 98 °F (36.7 °C)  Pulse:  [91-99] 93  Resp:  [16-38] 26  SpO2:  [94 %-97 %] 94 %  BP: (145-162)/(76-98) 145/85        There is no height or weight on file to calculate BMI.    SpO2: (!) 94 %  O2 Device (Oxygen Therapy): BiPAP      Intake/Output Summary (Last 24 hours) at 3/18/2019 2355  Last data filed at 3/18/2019 2350  Gross per 24 hour   Intake --   Output 1400 ml   Net -1400 ml       Lines/Drains/Airways     Peripheral Intravenous Line                 Peripheral IV - Single Lumen 03/18/19 1814 Left Antecubital less than 1 day         Peripheral IV - Single Lumen 03/18/19 2318 Right Hand less than 1 day                Physical Exam   Constitutional: She is oriented to person, place, and time. Vital signs are normal. She appears well-nourished. She has a sickly appearance. She appears ill. She appears distressed. Face mask in place.   HENT:   Head: Normocephalic and atraumatic.   Eyes: Conjunctivae are normal. Pupils are equal, round, and reactive to light.   Neck: Normal range of motion. Neck supple. JVD present. No thyromegaly present.   Cardiovascular: Normal rate, regular rhythm, normal heart sounds and intact distal pulses. Exam reveals no gallop and no friction rub.   No murmur heard.  Pulses:       Carotid pulses are 2+ on the right side, and 2+ on the left side.       Radial pulses are 2+ on the right side, and 2+ on the left side.   Pulmonary/Chest: Tachypnea noted. She is in respiratory distress. She has decreased breath sounds in the right middle field, the right lower field and the left lower field. She has wheezes in the right upper field, the right middle field, the right lower field, the left upper field, the left middle field and the left lower field.   Abdominal: Soft. Bowel sounds are normal. She exhibits no distension. There is no tenderness.   Musculoskeletal: She exhibits edema.   Neurological: She is alert and oriented to person, place, and time.   Skin: Skin  is warm. She is not diaphoretic. No cyanosis. No pallor. Nails show no clubbing.       Significant Labs:   CMP   Recent Labs   Lab 03/18/19  1839      K 4.0   CL 89*   CO2 38*   *   BUN 12   CREATININE 0.6   CALCIUM 9.4   PROT 6.6   ALBUMIN 2.9*   BILITOT 0.8   ALKPHOS 92   AST 25   ALT 11   ANIONGAP 9   ESTGFRAFRICA >60.0   EGFRNONAA >60.0   , CBC   Recent Labs   Lab 03/18/19  1839   WBC 15.33*   HGB 12.3   HCT 39.6      , INR No results for input(s): INR, PROTIME in the last 48 hours. and Lipid Panel No results for input(s): CHOL, HDL, LDLCALC, TRIG, CHOLHDL in the last 48 hours.    Significant Imaging: Echocardiogram: Transthoracic echo (TTE) complete (Cupid Only): No results found for this or any previous visit.    Assessment and Plan:     Elevated troponin    Unclear if chest pain or any anginal equilevante as patient with profound dementia.   EKG unreveleing NSR HR 90s, no acute ST changes with NM 06/2017 negative   - Troponin 0.330, likely in the setting of demand ischemia (hypoxic, HF)    - Trend troponin  - Continue GDMT as tolerated  - Pending limited TTE for WMA, however would manage conservatively as goals of care being discussed, unless STEMI      Acute decompensated heart failure    Ms. Negron is a 78yoF admitted for acute hypercarbic respiratory failure, likely multifactorial with COPD / Asthma exacerbation, ADHF, ?Aspiration pneumonitis / PNA with underlying profound dementia. Based on blood work on physical exam dose not appear to be in cardiogenic shock.     At lenght discussion with daughter at bedside, who states she would not want Ms. Negron to undergo any aggressive means of life sustaining measures such as intubation, chest compression, DCCV, vasopressor support, she would want to pursue comfort means. However would want to discuss this further with her family (3x siblings). Upon my assessment patient warm, fluid overloaded, HD stable, on bipap, with excellent UOP -1.4L  thus far. Discussed plans with Medicine Hospitalist, since limited ICU beds and goals of care discussion likely leaning towards comfort patient would not benefit from ICU level of admission.     - Will admit to Medicine, accepting, appreciate assistance  - CCU team will continue to follow and management HF   - Continue Lasix ggt 10mg/hr  - Limited TTE in AM assess EF   - Closely monitor BMP / MAG, replete aggressively   - Okay to wean off Bipap in the following few hours place on HFNC   - Closely monitor UOP  - Daily weights   - Recommend SLP evaluation   - Consider Aspiration PNA evaluation          VTE Risk Mitigation (From admission, onward)    None          Thank you for your consult. I will follow-up with patient. Please contact us if you have any additional questions.    Edilberto Sneed MD  Cardiology   [facility]

## 2019-03-19 NOTE — ED NOTES
Report received from GUS Dickey. Pt is AAOx3. Pt is on Bipap at this time due to low O2 sats from Flu. VSS. Side rails upx2, call bell in place. Will continue to monitor.

## 2019-03-19 NOTE — ASSESSMENT & PLAN NOTE
-Likely due to a combination of COPD exacerbation from influenza as well as CHF exacerbation  -In some respiratory distress and requiring pressure support from BiPAP (12/5 on my assessment), she is remarkably well compensated on ABG though breathing 30-35/min. Increased pressure support to 15/5. Baseline CO2 appears to be mid 30s  -Unfortunately her clinical history of presumably an improvement leading to discharge from hospital then rapid decline over a couple days is worrisome for possible underlying post-viral PNA though I think less likely I cannot exclude and she is in significant respiratory distress  -Solumedrol 60mg IV BID  -Duonebs q4h  -Lasix gtt @10, monitor output for goal net negative 2-3L in 24 hrs  -Vancomycin and Cefepime for possible PNA/HAP, respiratory culture pending, blood cultures pending, procal and CRP pending, if dramatically improves with diuresis can stop. If not improving low threshold for CT scan of chest  -Continue tamiflu to complete 5 day course

## 2019-03-19 NOTE — ED NOTES
Oral care performed. Lips moistened. Chapstick applied. Pt given spit bag and tissue to cough up resp sputum. Family at bedside and aware of POC.

## 2019-03-19 NOTE — ASSESSMENT & PLAN NOTE
Ms. Negron is a 78yoF admitted for acute hypercarbic respiratory failure, likely multifactorial with COPD / Asthma exacerbation, ADHF, ?Aspiration pneumonitis / PNA with underlying profound dementia. Based on blood work on physical exam dose not appear to be in cardiogenic shock.     At lenght discussion with daughter at bedside, who states she would not want Ms. Negron to undergo any aggressive means of life sustaining measures such as intubation, chest compression, DCCV, vasopressor support, she would want to pursue comfort means. However would want to discuss this further with her family (3x siblings). Upon my assessment patient warm, fluid overloaded, HD stable, on bipap, with excellent UOP -1.4L thus far. Discussed plans with Medicine Hospitalist, since limited ICU beds and goals of care discussion likely leaning towards comfort patient would not benefit from ICU level of admission.     - Will admit to Medicine, accepting, appreciate assistance  - CCU team will continue to follow and management HF   - Continue Lasix ggt 10mg/hr  - Limited TTE in AM assess EF   - Closely monitor BMP / MAG, replete aggressively   - Okay to wean off Bipap in the following few hours place on HFNC   - Closely monitor UOP  - Daily weights   - Recommend SLP evaluation   - Consider Aspiration PNA evaluation

## 2019-03-19 NOTE — ED NOTES
Hospital med team C paged by resp to notifiy Dr. Caro of Critical PCO2 72.7. Plan is to take off bipap in about an hour and trend down O2. Resp aware of POC.

## 2019-03-19 NOTE — SUBJECTIVE & OBJECTIVE
Past Medical History:   Diagnosis Date    Anemia     Anxiety     Asthma     Constipation     COPD (chronic obstructive pulmonary disease)     Dementia     Dependence on supplemental oxygen     Diabetes mellitus type 2 in obese     Difficulty in walking     Edema     Hypertension     Major depression, single episode     Risk for falls     Thrombocytopenia     Urinary tract infection     Vaginal infection        History reviewed. No pertinent surgical history.    Review of patient's allergies indicates:  No Known Allergies    No current facility-administered medications on file prior to encounter.      Current Outpatient Medications on File Prior to Encounter   Medication Sig    acetaminophen (TYLENOL) 500 MG tablet Take 500 mg by mouth every 4 (four) hours as needed for Pain.     albuterol-ipratropium (DUO-NEB) 2.5 mg-0.5 mg/3 mL nebulizer solution Take 3 mLs by nebulization every 6 (six) hours as needed for Wheezing.    aspirin (ECOTRIN) 81 MG EC tablet Take 81 mg by mouth once daily.    clonazePAM (KLONOPIN) 0.5 MG tablet Take 1 tablet (0.5 mg total) by mouth 2 (two) times daily as needed (anxiety).    dextran 70-hypromellose (TEARS) ophthalmic solution Place 2 drops into both eyes 2 (two) times daily.    docusate sodium (COLACE) 100 MG capsule Take 1 capsule (100 mg total) by mouth 2 (two) times daily.    escitalopram oxalate (LEXAPRO) 20 MG tablet Take 20 mg by mouth once daily.    fluticasone-salmeterol 250-50 mcg/dose (ADVAIR) 250-50 mcg/dose diskus inhaler Inhale 1 puff into the lungs 2 (two) times daily.    furosemide (LASIX) 20 MG tablet Take 1 tablet (20 mg total) by mouth once daily.    guaiFENesin (MUCINEX) 600 mg 12 hr tablet Take 1 tablet (600 mg total) by mouth 2 (two) times daily. For 3 days    lisinopril (PRINIVIL,ZESTRIL) 20 MG tablet Take 1 tablet (20 mg total) by mouth once daily.    loratadine (CLARITIN) 10 mg tablet Take 10 mg by mouth once daily.    meclizine  (ANTIVERT) 32 MG tablet Take 25 mg by mouth 3 (three) times daily as needed.    melatonin 3 mg TbSR Take 2 tablets by mouth every evening.    metoprolol succinate (TOPROL-XL) 25 MG 24 hr tablet Take 25 mg by mouth every evening.     mirabegron (MYRBETRIQ) 25 mg Tb24 ER tablet Take 1 tablet (25 mg total) by mouth once daily.    multivitamin capsule Take 1 capsule by mouth once daily.    oseltamivir (TAMIFLU) 75 MG capsule Take 1 capsule (75 mg total) by mouth 2 (two) times daily. for 5 days    polyethylene glycol (GLYCOLAX) 17 gram PwPk Take 17 g by mouth once daily at 6am.     potassium chloride (KLOR-CON) 20 mEq Pack Take 20 mEq by mouth once daily.    predniSONE (DELTASONE) 10 MG tablet Take 5 tablets (50 mg total) by mouth once daily for 5 days, THEN 2.5 tablets (25 mg total) once daily for 5 days.    tiotropium (SPIRIVA) 18 mcg inhalation capsule Inhale 18 mcg into the lungs once daily.     Family History     None        Tobacco Use    Smoking status: Former Smoker     Packs/day: 1.00     Types: Cigarettes     Last attempt to quit: 2012     Years since quittin.7    Smokeless tobacco: Never Used   Substance and Sexual Activity    Alcohol use: No    Drug use: No    Sexual activity: No     Review of Systems   Unable to perform ROS: Severe respiratory distress     Objective:     Vital Signs (Most Recent):  Temp: 98 °F (36.7 °C) (19)  Pulse: 93 (19)  Resp: (!) 26 (19)  BP: (!) 145/85 (19)  SpO2: (!) 94 % (19) Vital Signs (24h Range):  Temp:  [98 °F (36.7 °C)] 98 °F (36.7 °C)  Pulse:  [91-99] 93  Resp:  [16-38] 26  SpO2:  [94 %-97 %] 94 %  BP: (145-162)/(76-98) 145/85     Weight: 88.5 kg (195 lb)  Body mass index is 31.47 kg/m².    Physical Exam   Constitutional: She appears lethargic. She appears ill. She appears distressed.   HENT:   Head: Normocephalic and atraumatic.   Right Ear: External ear normal.   Left Ear: External ear normal.    Eyes: Conjunctivae and EOM are normal. Right eye exhibits no discharge. Left eye exhibits no discharge.   Neck: Normal range of motion. Neck supple.   Cardiovascular: Regular rhythm and intact distal pulses. Tachycardia present. Exam reveals no decreased pulses.   Pulmonary/Chest: No accessory muscle usage. She is in respiratory distress. She has decreased breath sounds in the right lower field and the left lower field. She has wheezes in the right upper field, the right middle field, the right lower field, the left upper field, the left middle field and the left lower field.   Abdominal: Soft. She exhibits no distension. There is no tenderness. There is no guarding.   Musculoskeletal: She exhibits edema (moderate SHANNAN). She exhibits no tenderness.   Neurological: She appears lethargic. GCS eye subscore is 3. GCS verbal subscore is 4. GCS motor subscore is 6.   Skin: Skin is warm and dry. Capillary refill takes 2 to 3 seconds. She is not diaphoretic.   Psychiatric: She is not agitated. Cognition and memory are impaired.         CRANIAL NERVES     CN III, IV, VI   Extraocular motions are normal.        Significant Labs:   CBC:   Recent Labs   Lab 03/18/19  1839   WBC 15.33*   HGB 12.3   HCT 39.6        CMP:   Recent Labs   Lab 03/18/19  1839      K 4.0   CL 89*   CO2 38*   *   BUN 12   CREATININE 0.6   CALCIUM 9.4   PROT 6.6   ALBUMIN 2.9*   BILITOT 0.8   ALKPHOS 92   AST 25   ALT 11   ANIONGAP 9   EGFRNONAA >60.0       Significant Imaging: I have reviewed all pertinent imaging results/findings within the past 24 hours.

## 2019-03-19 NOTE — SUBJECTIVE & OBJECTIVE
Past Medical History:   Diagnosis Date    Anemia     Anxiety     Asthma     Constipation     COPD (chronic obstructive pulmonary disease)     Dementia     Dependence on supplemental oxygen     Diabetes mellitus type 2 in obese     Difficulty in walking     Edema     Hypertension     Major depression, single episode     Risk for falls     Thrombocytopenia     Urinary tract infection     Vaginal infection        History reviewed. No pertinent surgical history.    Review of patient's allergies indicates:  No Known Allergies    No current facility-administered medications on file prior to encounter.      Current Outpatient Medications on File Prior to Encounter   Medication Sig    acetaminophen (TYLENOL) 500 MG tablet Take 500 mg by mouth every 4 (four) hours as needed for Pain.     albuterol-ipratropium (DUO-NEB) 2.5 mg-0.5 mg/3 mL nebulizer solution Take 3 mLs by nebulization every 6 (six) hours as needed for Wheezing.    aspirin (ECOTRIN) 81 MG EC tablet Take 81 mg by mouth once daily.    clonazePAM (KLONOPIN) 0.5 MG tablet Take 1 tablet (0.5 mg total) by mouth 2 (two) times daily as needed (anxiety).    dextran 70-hypromellose (TEARS) ophthalmic solution Place 2 drops into both eyes 2 (two) times daily.    docusate sodium (COLACE) 100 MG capsule Take 1 capsule (100 mg total) by mouth 2 (two) times daily.    escitalopram oxalate (LEXAPRO) 20 MG tablet Take 20 mg by mouth once daily.    fluticasone-salmeterol 250-50 mcg/dose (ADVAIR) 250-50 mcg/dose diskus inhaler Inhale 1 puff into the lungs 2 (two) times daily.    furosemide (LASIX) 20 MG tablet Take 1 tablet (20 mg total) by mouth once daily.    guaiFENesin (MUCINEX) 600 mg 12 hr tablet Take 1 tablet (600 mg total) by mouth 2 (two) times daily. For 3 days    lisinopril (PRINIVIL,ZESTRIL) 20 MG tablet Take 1 tablet (20 mg total) by mouth once daily.    loratadine (CLARITIN) 10 mg tablet Take 10 mg by mouth once daily.    meclizine  (ANTIVERT) 32 MG tablet Take 25 mg by mouth 3 (three) times daily as needed.    melatonin 3 mg TbSR Take 2 tablets by mouth every evening.    metoprolol succinate (TOPROL-XL) 25 MG 24 hr tablet Take 25 mg by mouth every evening.     mirabegron (MYRBETRIQ) 25 mg Tb24 ER tablet Take 1 tablet (25 mg total) by mouth once daily.    multivitamin capsule Take 1 capsule by mouth once daily.    oseltamivir (TAMIFLU) 75 MG capsule Take 1 capsule (75 mg total) by mouth 2 (two) times daily. for 5 days    polyethylene glycol (GLYCOLAX) 17 gram PwPk Take 17 g by mouth once daily at 6am.     potassium chloride (KLOR-CON) 20 mEq Pack Take 20 mEq by mouth once daily.    predniSONE (DELTASONE) 10 MG tablet Take 5 tablets (50 mg total) by mouth once daily for 5 days, THEN 2.5 tablets (25 mg total) once daily for 5 days.    tiotropium (SPIRIVA) 18 mcg inhalation capsule Inhale 18 mcg into the lungs once daily.     Family History     None        Tobacco Use    Smoking status: Former Smoker     Packs/day: 1.00     Types: Cigarettes     Last attempt to quit: 2012     Years since quittin.7    Smokeless tobacco: Never Used   Substance and Sexual Activity    Alcohol use: No    Drug use: No    Sexual activity: No     Review of Systems   Unable to perform ROS: dementia     Objective:     Vital Signs (Most Recent):  Temp: 98 °F (36.7 °C) (19)  Pulse: 93 (19)  Resp: (!) 26 (19)  BP: (!) 145/85 (19)  SpO2: (!) 94 % (19) Vital Signs (24h Range):  Temp:  [98 °F (36.7 °C)] 98 °F (36.7 °C)  Pulse:  [91-99] 93  Resp:  [16-38] 26  SpO2:  [94 %-97 %] 94 %  BP: (145-162)/(76-98) 145/85        There is no height or weight on file to calculate BMI.    SpO2: (!) 94 %  O2 Device (Oxygen Therapy): BiPAP      Intake/Output Summary (Last 24 hours) at 3/18/2019 6064  Last data filed at 3/18/2019 3016  Gross per 24 hour   Intake --   Output 1400 ml   Net -1400 ml        Lines/Drains/Airways     Peripheral Intravenous Line                 Peripheral IV - Single Lumen 03/18/19 1814 Left Antecubital less than 1 day         Peripheral IV - Single Lumen 03/18/19 2318 Right Hand less than 1 day                Physical Exam   Constitutional: She is oriented to person, place, and time. Vital signs are normal. She appears well-nourished. She has a sickly appearance. She appears ill. She appears distressed. Face mask in place.   HENT:   Head: Normocephalic and atraumatic.   Eyes: Conjunctivae are normal. Pupils are equal, round, and reactive to light.   Neck: Normal range of motion. Neck supple. JVD present. No thyromegaly present.   Cardiovascular: Normal rate, regular rhythm, normal heart sounds and intact distal pulses. Exam reveals no gallop and no friction rub.   No murmur heard.  Pulses:       Carotid pulses are 2+ on the right side, and 2+ on the left side.       Radial pulses are 2+ on the right side, and 2+ on the left side.   Pulmonary/Chest: Tachypnea noted. She is in respiratory distress. She has decreased breath sounds in the right middle field, the right lower field and the left lower field. She has wheezes in the right upper field, the right middle field, the right lower field, the left upper field, the left middle field and the left lower field.   Abdominal: Soft. Bowel sounds are normal. She exhibits no distension. There is no tenderness.   Musculoskeletal: She exhibits edema.   Neurological: She is alert and oriented to person, place, and time.   Skin: Skin is warm. She is not diaphoretic. No cyanosis. No pallor. Nails show no clubbing.       Significant Labs:   CMP   Recent Labs   Lab 03/18/19  1839      K 4.0   CL 89*   CO2 38*   *   BUN 12   CREATININE 0.6   CALCIUM 9.4   PROT 6.6   ALBUMIN 2.9*   BILITOT 0.8   ALKPHOS 92   AST 25   ALT 11   ANIONGAP 9   ESTGFRAFRICA >60.0   EGFRNONAA >60.0   , CBC   Recent Labs   Lab 03/18/19  1839   WBC 15.33*   HGB  12.3   HCT 39.6      , INR No results for input(s): INR, PROTIME in the last 48 hours. and Lipid Panel No results for input(s): CHOL, HDL, LDLCALC, TRIG, CHOLHDL in the last 48 hours.    Significant Imaging: Echocardiogram: Transthoracic echo (TTE) complete (Cupid Only): No results found for this or any previous visit.

## 2019-03-19 NOTE — H&P
[facility] Hospital Medicine  History & Physical    Patient Name: Laura Negron  MRN: [id number]  Admission Date: 3/18/2019  Attending Physician: Trav Pollock MD   Primary Care Provider: Jorge Hair MD    [facility] Medicine Team: [facility]OSP MED C Kiran Bliss IV, MD     Patient information was obtained from patient, relative(s), past medical records and ER records.     Subjective:     Principal Problem:Acute on chronic respiratory failure with hypoxia and hypercapnia    Chief Complaint:   Chief Complaint   Patient presents with    Shortness of Breath     Increase SOB over the last two days.  Presents on CPAP with duo neb administered.          HPI: 77 y/o with COPD on 2L home O2, HFpEF, HTN, dementia (baseline waxing and waning orientation to family and place, never year), DM2 who presents from [facility] where she has been since 2015 with chief complaint of dyspnea. Per her daughter at bedside who provides history given respiratory distress of patient she had recent URI sx's for the past few weeks for which she presented to [facility] 03/13-03/16 and admitted to ICU for respiratory failure thought to be 2/2 COPD exacerbation from influenza A. She was discharged on oral steroids and tamiflu. Per family she again worsened at nursing [place] for past 2 days and presented to ER there and although notes are not complete was given albuterol and had a chest x-ray showing a right perihilar opacity cannot exclude underlying mass lesion recommend further evaluation with chest CT. This is chronic and has been read as fullness previously. Also showed interstitial pulmonary vascular congestion. Her daughter reports [place] called her the next day and reported she continued to decline so was transferred here for management. Her daughter also reports patient has progressively declined, now completely wheelchair bound and 100% dependent with all of her ADLs, additionally  recently noted to have gurgling / choking with po intake. Her daughter is unaware of her medication list.     Initially put on lasix gtt with plans for CCU admission but GOC discussion with CCU fellow was notable for her stated she would not want any aggressive measures for her mother, she would not want any chest compression, intubation, DCCV, pressor support. However would further like to discuss with her siblings ( 2x brothers / 1x sister) prior to making decision. I had a similar discussion where she states she does not think her mother would want that based on previous things she has said but had never explicitly said it so she wanted to discuss with family.     Past Medical History:   Diagnosis Date    Anemia     Anxiety     Asthma     Constipation     COPD (chronic obstructive pulmonary disease)     Dementia     Dependence on supplemental oxygen     Diabetes mellitus type 2 in obese     Difficulty in walking     Edema     Hypertension     Major depression, single episode     Risk for falls     Thrombocytopenia     Urinary tract infection     Vaginal infection        History reviewed. No pertinent surgical history.    Review of patient's allergies indicates:  No Known Allergies    No current facility-administered medications on file prior to encounter.      Current Outpatient Medications on File Prior to Encounter   Medication Sig    acetaminophen (TYLENOL) 500 MG tablet Take 500 mg by mouth every 4 (four) hours as needed for Pain.     albuterol-ipratropium (DUO-NEB) 2.5 mg-0.5 mg/3 mL nebulizer solution Take 3 mLs by nebulization every 6 (six) hours as needed for Wheezing.    aspirin (ECOTRIN) 81 MG EC tablet Take 81 mg by mouth once daily.    clonazePAM (KLONOPIN) 0.5 MG tablet Take 1 tablet (0.5 mg total) by mouth 2 (two) times daily as needed (anxiety).    dextran 70-hypromellose (TEARS) ophthalmic solution Place 2 drops into both eyes 2 (two) times daily.    docusate sodium (COLACE)  100 MG capsule Take 1 capsule (100 mg total) by mouth 2 (two) times daily.    escitalopram oxalate (LEXAPRO) 20 MG tablet Take 20 mg by mouth once daily.    fluticasone-salmeterol 250-50 mcg/dose (ADVAIR) 250-50 mcg/dose diskus inhaler Inhale 1 puff into the lungs 2 (two) times daily.    furosemide (LASIX) 20 MG tablet Take 1 tablet (20 mg total) by mouth once daily.    guaiFENesin (MUCINEX) 600 mg 12 hr tablet Take 1 tablet (600 mg total) by mouth 2 (two) times daily. For 3 days    lisinopril (PRINIVIL,ZESTRIL) 20 MG tablet Take 1 tablet (20 mg total) by mouth once daily.    loratadine (CLARITIN) 10 mg tablet Take 10 mg by mouth once daily.    meclizine (ANTIVERT) 32 MG tablet Take 25 mg by mouth 3 (three) times daily as needed.    melatonin 3 mg TbSR Take 2 tablets by mouth every evening.    metoprolol succinate (TOPROL-XL) 25 MG 24 hr tablet Take 25 mg by mouth every evening.     mirabegron (MYRBETRIQ) 25 mg Tb24 ER tablet Take 1 tablet (25 mg total) by mouth once daily.    multivitamin capsule Take 1 capsule by mouth once daily.    oseltamivir (TAMIFLU) 75 MG capsule Take 1 capsule (75 mg total) by mouth 2 (two) times daily. for 5 days    polyethylene glycol (GLYCOLAX) 17 gram PwPk Take 17 g by mouth once daily at 6am.     potassium chloride (KLOR-CON) 20 mEq Pack Take 20 mEq by mouth once daily.    predniSONE (DELTASONE) 10 MG tablet Take 5 tablets (50 mg total) by mouth once daily for 5 days, THEN 2.5 tablets (25 mg total) once daily for 5 days.    tiotropium (SPIRIVA) 18 mcg inhalation capsule Inhale 18 mcg into the lungs once daily.     Family History     None        Tobacco Use    Smoking status: Former Smoker     Packs/day: 1.00     Types: Cigarettes     Last attempt to quit: 2012     Years since quittin.7    Smokeless tobacco: Never Used   Substance and Sexual Activity    Alcohol use: No    Drug use: No    Sexual activity: No     Review of Systems   Unable to perform ROS:  Severe respiratory distress     Objective:     Vital Signs (Most Recent):  Temp: 98 °F (36.7 °C) (03/18/19 1812)  Pulse: 93 (03/18/19 2346)  Resp: (!) 26 (03/18/19 2346)  BP: (!) 145/85 (03/18/19 2346)  SpO2: (!) 94 % (03/18/19 2346) Vital Signs (24h Range):  Temp:  [98 °F (36.7 °C)] 98 °F (36.7 °C)  Pulse:  [91-99] 93  Resp:  [16-38] 26  SpO2:  [94 %-97 %] 94 %  BP: (145-162)/(76-98) 145/85     Weight: 88.5 kg (195 lb)  Body mass index is 31.47 kg/m².    Physical Exam   Constitutional: She appears lethargic. She appears ill. She appears distressed.   HENT:   Head: Normocephalic and atraumatic.   Right Ear: External ear normal.   Left Ear: External ear normal.   Eyes: Conjunctivae and EOM are normal. Right eye exhibits no discharge. Left eye exhibits no discharge.   Neck: Normal range of motion. Neck supple.   Cardiovascular: Regular rhythm and intact distal pulses. Tachycardia present. Exam reveals no decreased pulses.   Pulmonary/Chest: No accessory muscle usage. She is in respiratory distress. She has decreased breath sounds in the right lower field and the left lower field. She has wheezes in the right upper field, the right middle field, the right lower field, the left upper field, the left middle field and the left lower field.   Abdominal: Soft. She exhibits no distension. There is no tenderness. There is no guarding.   Musculoskeletal: She exhibits edema (moderate SHANNAN). She exhibits no tenderness.   Neurological: She appears lethargic. GCS eye subscore is 3. GCS verbal subscore is 4. GCS motor subscore is 6.   Skin: Skin is warm and dry. Capillary refill takes 2 to 3 seconds. She is not diaphoretic.   Psychiatric: She is not agitated. Cognition and memory are impaired.         CRANIAL NERVES     CN III, IV, VI   Extraocular motions are normal.        Significant Labs:   CBC:   Recent Labs   Lab 03/18/19  1839   WBC 15.33*   HGB 12.3   HCT 39.6        CMP:   Recent Labs   Lab 03/18/19  1839       K 4.0   CL 89*   CO2 38*   *   BUN 12   CREATININE 0.6   CALCIUM 9.4   PROT 6.6   ALBUMIN 2.9*   BILITOT 0.8   ALKPHOS 92   AST 25   ALT 11   ANIONGAP 9   EGFRNONAA >60.0       Significant Imaging: I have reviewed all pertinent imaging results/findings within the past 24 hours.    Assessment/Plan:     * Acute on chronic respiratory failure with hypoxia and hypercapnia    -Likely due to a combination of COPD exacerbation from influenza as well as CHF exacerbation  -In some respiratory distress and requiring pressure support from BiPAP (12/5 on my assessment), she is remarkably well compensated on ABG though breathing 30-35/min. Increased pressure support to 15/5. Baseline CO2 appears to be mid 30s  -Unfortunately her clinical history of presumably an improvement leading to discharge from hospital then rapid decline over a couple days is worrisome for possible underlying post-viral PNA though I think less likely I cannot exclude and she is in significant respiratory distress  -Solumedrol 60mg IV BID  -Duonebs q4h  -Lasix gtt @10, monitor output for goal net negative 2-3L in 24 hrs  -Vancomycin and Cefepime for possible PNA/HAP, respiratory culture pending, blood cultures pending, procal and CRP pending, if dramatically improves with diuresis can stop. If not improving low threshold for CT scan of chest  -Continue tamiflu to complete 5 day course       Goals of care, counseling/discussion    -See note by CCU fellow and my HPI  -I encouraged daughter to let all family members know and discuss ASAP. Several coming in tomorrow.        Encephalopathy, metabolic    -Acute on chronic due to active issues and respiratory distress       Demand ischemia    -2/2 CHF exacerbation vs infection   -Trend and stop if flat or downtrending       Acute on chronic diastolic congestive heart failure    -See respiratory failure  -Hold home metoprolol and lisinopril, if stable and improving in AM can consider restarting        Influenza A    -See respiratory failure       DM2 (diabetes mellitus, type 2)    -LDSSI       Major depression, single episode    -Continue home SSRI, hold benzo given respiratory distress       Chronic obstructive pulmonary disease with acute exacerbation    -See respiratory failure       Essential hypertension    -Hold home meds, restart if stable in AM          VTE Risk Mitigation (From admission, onward)        Ordered     IP VTE HIGH RISK PATIENT  Once      03/19/19 0029     Place PAM hose  Until discontinued      03/19/19 0022        Kiran Bliss IV, MD  Department of Hospital Medicine   [facility]

## 2019-03-19 NOTE — ASSESSMENT & PLAN NOTE
-See note by CCU fellow and my HPI  -I encouraged daughter to let all family members know and discuss ASAP. Several coming in tomorrow.

## 2019-03-19 NOTE — ASSESSMENT & PLAN NOTE
-Admit to CCU for IV diuresis with lasix drip; bolus with 80 mg IVP now and drip at 10 mg/hour  -Monitor BiPAP and wean off as tolerated   -ABG in 2 hours  -BMP and Mg in 2 hour; replace as needed to keep > 4  And 2 respectively  -Goals of care discussion with the family

## 2019-03-19 NOTE — PROGRESS NOTES
[facility]  Cardiology  Consult Note    Patient Name: Laura Negron  MRN: [id number]  Admission Date: 3/18/2019  Hospital Length of Stay: 0 days  Code Status: Prior   Attending Physician: Trav Pollock MD   Primary Care Physician: Jorge Hair MD  Expected Discharge Date:   Principal Problem:<principal problem not specified>    Subjective:     Hospital Course:   No notes on file    Interval History:   Mr Negron is a 77 yo man with medical history significant for COPD, asthma, dementia, hypertension, diabetes mellitus type 2, who presents to the ED via EMS, with a complaint of shortness of breath over the last 2 days. The patient arrives on CPAP with duo nebs administered. Shortness of breath has been worsening for the past 2 days.   Upon ED presentation his BP was 130/90 and O2 sat 90% on RA. He was put on BiPAP and lasix 80 mg IVP was given. He made 800 cc of urine.           Review of Systems   Constitution: Positive for weakness. Negative for chills, decreased appetite, diaphoresis, fever, malaise/fatigue, night sweats, weight gain and weight loss.   HENT: Negative.    Eyes: Negative.    Cardiovascular: Positive for dyspnea on exertion and orthopnea. Negative for chest pain, claudication, cyanosis, irregular heartbeat, leg swelling, near-syncope, palpitations, paroxysmal nocturnal dyspnea and syncope.   Respiratory: Positive for cough and shortness of breath. Negative for hemoptysis, sleep disturbances due to breathing, snoring, sputum production and wheezing.    Endocrine: Negative.    Gastrointestinal: Negative.    Genitourinary: Negative.      Objective:     Vital Signs (Most Recent):  Temp: 98 °F (36.7 °C) (03/18/19 1812)  Pulse: 91 (03/18/19 2047)  Resp: (!) 22 (03/18/19 2047)  BP: (!) 162/85 (03/18/19 2047)  SpO2: 95 % (03/18/19 2047) Vital Signs (24h Range):  Temp:  [98 °F (36.7 °C)] 98 °F (36.7 °C)  Pulse:  [91-99] 91  Resp:  [16-38] 22  SpO2:  [94 %-97 %] 95 %  BP:  (148-162)/(76-98) 162/85        There is no height or weight on file to calculate BMI.     SpO2: 95 %  O2 Device (Oxygen Therapy): BiPAP      Intake/Output Summary (Last 24 hours) at 3/18/2019 2156  Last data filed at 3/18/2019 2124  Gross per 24 hour   Intake --   Output 700 ml   Net -700 ml       Lines/Drains/Airways     Peripheral Intravenous Line                 Peripheral IV - Single Lumen 03/18/19 1814 Left Antecubital less than 1 day                Physical Exam   Constitutional: She is oriented to person, place, and time. She appears well-developed and well-nourished.   HENT:   Head: Normocephalic and atraumatic.   Eyes: Conjunctivae are normal.   Neck: Neck supple. JVD present.   Cardiovascular: Normal rate, regular rhythm and intact distal pulses. Exam reveals gallop.   Pulmonary/Chest: Effort normal and breath sounds normal. No respiratory distress. She has no wheezes. She has no rales.   Abdominal: Soft. Bowel sounds are normal. She exhibits no distension. There is no tenderness. There is no rebound.   Musculoskeletal: Normal range of motion.   Neurological: She is oriented to person, place, and time.   Skin: Skin is warm.   Nursing note and vitals reviewed.      Significant Labs:   BMP:   Recent Labs   Lab 03/18/19 1839   *      K 4.0   CL 89*   CO2 38*   BUN 12   CREATININE 0.6   CALCIUM 9.4   , CMP   Recent Labs   Lab 03/18/19  1839      K 4.0   CL 89*   CO2 38*   *   BUN 12   CREATININE 0.6   CALCIUM 9.4   PROT 6.6   ALBUMIN 2.9*   BILITOT 0.8   ALKPHOS 92   AST 25   ALT 11   ANIONGAP 9   ESTGFRAFRICA >60.0   EGFRNONAA >60.0   , CBC   Recent Labs   Lab 03/18/19  1839   WBC 15.33*   HGB 12.3   HCT 39.6      , INR No results for input(s): INR, PROTIME in the last 48 hours., Lipid Panel No results for input(s): CHOL, HDL, LDLCALC, TRIG, CHOLHDL in the last 48 hours., Troponin   Recent Labs   Lab 03/18/19  1839   TROPONINI 0.330*   , All pertinent lab results from the  last 24 hours have been reviewed. and   Recent Lab Results       03/18/19  1911   03/18/19  1839        Immature Grans (Abs)   0.17  Comment:  Mild elevation in immature granulocytes is non specific and   can be seen in a variety of conditions including stress response,   acute inflammation, trauma and pregnancy. Correlation with other   laboratory and clinical findings is essential.       Albumin   2.9     Alkaline Phosphatase   92     Allens Test Pass       ALT   11     Anion Gap   9     AST   25  Comment:  *Result may be interfered by visible hemolysis     Baso #   0.03     Basophil%   0.2     Total Bilirubin   0.8  Comment:  For infants and newborns, interpretation of results should be based  on gestational age, weight and in agreement with clinical  observations.  Premature Infant recommended reference ranges:  Up to 24 hours.............<8.0 mg/dL  Up to 48 hours............<12.0 mg/dL  3-5 days..................<15.0 mg/dL  6-29 days.................<15.0 mg/dL       BNP   1,125  Comment:  Values of less than 100 pg/ml are consistent with non-CHF populations.     Site LR       BUN, Bld   12     Calcium   9.4     Chloride   89     CO2   38     Creatinine   0.6     DelSys CPAP/BiPAP       Differential Method   Automated     eGFR if    >60.0     eGFR if non    >60.0  Comment:  Calculation used to obtain the estimated glomerular filtration  rate (eGFR) is the CKD-EPI equation.        Eos #   0.0     Eosinophil%   0.0     EP 5       FiO2 40       Glucose   129     Gran # (ANC)   13.9     Gran%   90.8     Hematocrit   39.6     Hemoglobin   12.3     Immature Granulocytes   1.1     IP 10       Lymph #   0.7     Lymph%   4.4     MCH   26.7     MCHC   31.1     MCV   86     Min Vol 11.7       Mode BiPAP       Mono #   0.5     Mono%   3.5     MPV   10.7     nRBC   0     Platelets   236     POC BE 18       POC HCO3 42.8       POC PCO2 71.3       POC PH 7.387       POC PO2 92       POC  SATURATED O2 97       POC TCO2 45       Potassium   4.0     Total Protein   6.6     Rate 10       RBC   4.61     RDW   15.1     Sample ARTERIAL       Sodium   136     Sp02 96       Spont Rate 24       Troponin I   0.330  Comment:  The reference interval for Troponin I represents the 99th percentile   cutoff   for our facility and is consistent with 3rd generation assay   performance.       WBC   15.33           Significant Imaging: Echocardiogram:   2D echo with color flow doppler:   Results for orders placed or performed during the hospital encounter of 03/20/17   2D echo with color flow doppler   Result Value Ref Range    QEF 55 55 - 65    Mitral Valve Regurgitation MILD     Diastolic Dysfunction Yes (A)     Est. PA Systolic Pressure 41.64 (A)     Tricuspid Valve Regurgitation MILD     and Transthoracic echo (TTE) complete (Cupid Only): No results found for this or any previous visit.    Assessment and Plan:       Acute decompensated heart failure    -Admit to CCU for IV diuresis with lasix drip; bolus with 80 mg IVP now and drip at 10 mg/hour  -Monitor BiPAP and wean off as tolerated   -ABG in 2 hours  -BMP and Mg in 2 hour; replace as needed to keep > 4  And 2 respectively  -Goals of care discussion with the family          VTE Risk Mitigation (From admission, onward)    None          Chucky Gusman MD  Cardiology  [facility]

## 2019-03-19 NOTE — SUBJECTIVE & OBJECTIVE
Interval History:   Mr Negron is a 79 yo man with medical history significant for COPD, asthma, dementia, hypertension, diabetes mellitus type 2, who presents to the ED via EMS, with a complaint of shortness of breath over the last 2 days. The patient arrives on CPAP with duo nebs administered. Shortness of breath has been worsening for the past 2 days.   Upon ED presentation his BP was 130/90 and O2 sat 90% on RA. He was put on BiPAP and lasix 80 mg IVP was given. He made 800 cc of urine.           Review of Systems   Constitution: Positive for weakness. Negative for chills, decreased appetite, diaphoresis, fever, malaise/fatigue, night sweats, weight gain and weight loss.   HENT: Negative.    Eyes: Negative.    Cardiovascular: Positive for dyspnea on exertion and orthopnea. Negative for chest pain, claudication, cyanosis, irregular heartbeat, leg swelling, near-syncope, palpitations, paroxysmal nocturnal dyspnea and syncope.   Respiratory: Positive for cough and shortness of breath. Negative for hemoptysis, sleep disturbances due to breathing, snoring, sputum production and wheezing.    Endocrine: Negative.    Gastrointestinal: Negative.    Genitourinary: Negative.      Objective:     Vital Signs (Most Recent):  Temp: 98 °F (36.7 °C) (03/18/19 1812)  Pulse: 91 (03/18/19 2047)  Resp: (!) 22 (03/18/19 2047)  BP: (!) 162/85 (03/18/19 2047)  SpO2: 95 % (03/18/19 2047) Vital Signs (24h Range):  Temp:  [98 °F (36.7 °C)] 98 °F (36.7 °C)  Pulse:  [91-99] 91  Resp:  [16-38] 22  SpO2:  [94 %-97 %] 95 %  BP: (148-162)/(76-98) 162/85        There is no height or weight on file to calculate BMI.     SpO2: 95 %  O2 Device (Oxygen Therapy): BiPAP      Intake/Output Summary (Last 24 hours) at 3/18/2019 2156  Last data filed at 3/18/2019 2124  Gross per 24 hour   Intake --   Output 700 ml   Net -700 ml       Lines/Drains/Airways     Peripheral Intravenous Line                 Peripheral IV - Single Lumen 03/18/19 1814 Left  Antecubital less than 1 day                Physical Exam   Constitutional: She is oriented to person, place, and time. She appears well-developed and well-nourished.   HENT:   Head: Normocephalic and atraumatic.   Eyes: Conjunctivae are normal.   Neck: Neck supple. JVD present.   Cardiovascular: Normal rate, regular rhythm and intact distal pulses. Exam reveals gallop.   Pulmonary/Chest: Effort normal and breath sounds normal. No respiratory distress. She has no wheezes. She has no rales.   Abdominal: Soft. Bowel sounds are normal. She exhibits no distension. There is no tenderness. There is no rebound.   Musculoskeletal: Normal range of motion.   Neurological: She is oriented to person, place, and time.   Skin: Skin is warm.   Nursing note and vitals reviewed.      Significant Labs:   BMP:   Recent Labs   Lab 03/18/19 1839   *      K 4.0   CL 89*   CO2 38*   BUN 12   CREATININE 0.6   CALCIUM 9.4   , CMP   Recent Labs   Lab 03/18/19 1839      K 4.0   CL 89*   CO2 38*   *   BUN 12   CREATININE 0.6   CALCIUM 9.4   PROT 6.6   ALBUMIN 2.9*   BILITOT 0.8   ALKPHOS 92   AST 25   ALT 11   ANIONGAP 9   ESTGFRAFRICA >60.0   EGFRNONAA >60.0   , CBC   Recent Labs   Lab 03/18/19 1839   WBC 15.33*   HGB 12.3   HCT 39.6      , INR No results for input(s): INR, PROTIME in the last 48 hours., Lipid Panel No results for input(s): CHOL, HDL, LDLCALC, TRIG, CHOLHDL in the last 48 hours., Troponin   Recent Labs   Lab 03/18/19 1839   TROPONINI 0.330*   , All pertinent lab results from the last 24 hours have been reviewed. and   Recent Lab Results       03/18/19  1911 03/18/19 1839        Immature Grans (Abs)   0.17  Comment:  Mild elevation in immature granulocytes is non specific and   can be seen in a variety of conditions including stress response,   acute inflammation, trauma and pregnancy. Correlation with other   laboratory and clinical findings is essential.       Albumin   2.9     Alkaline  Phosphatase   92     Allens Test Pass       ALT   11     Anion Gap   9     AST   25  Comment:  *Result may be interfered by visible hemolysis     Baso #   0.03     Basophil%   0.2     Total Bilirubin   0.8  Comment:  For infants and newborns, interpretation of results should be based  on gestational age, weight and in agreement with clinical  observations.  Premature Infant recommended reference ranges:  Up to 24 hours.............<8.0 mg/dL  Up to 48 hours............<12.0 mg/dL  3-5 days..................<15.0 mg/dL  6-29 days.................<15.0 mg/dL       BNP   1,125  Comment:  Values of less than 100 pg/ml are consistent with non-CHF populations.     Site LR       BUN, Bld   12     Calcium   9.4     Chloride   89     CO2   38     Creatinine   0.6     DelSys CPAP/BiPAP       Differential Method   Automated     eGFR if    >60.0     eGFR if non    >60.0  Comment:  Calculation used to obtain the estimated glomerular filtration  rate (eGFR) is the CKD-EPI equation.        Eos #   0.0     Eosinophil%   0.0     EP 5       FiO2 40       Glucose   129     Gran # (ANC)   13.9     Gran%   90.8     Hematocrit   39.6     Hemoglobin   12.3     Immature Granulocytes   1.1     IP 10       Lymph #   0.7     Lymph%   4.4     MCH   26.7     MCHC   31.1     MCV   86     Min Vol 11.7       Mode BiPAP       Mono #   0.5     Mono%   3.5     MPV   10.7     nRBC   0     Platelets   236     POC BE 18       POC HCO3 42.8       POC PCO2 71.3       POC PH 7.387       POC PO2 92       POC SATURATED O2 97       POC TCO2 45       Potassium   4.0     Total Protein   6.6     Rate 10       RBC   4.61     RDW   15.1     Sample ARTERIAL       Sodium   136     Sp02 96       Spont Rate 24       Troponin I   0.330  Comment:  The reference interval for Troponin I represents the 99th percentile   cutoff   for our facility and is consistent with 3rd generation assay   performance.       WBC   15.33            Significant Imaging: Echocardiogram:   2D echo with color flow doppler:   Results for orders placed or performed during the hospital encounter of 03/20/17   2D echo with color flow doppler   Result Value Ref Range    QEF 55 55 - 65    Mitral Valve Regurgitation MILD     Diastolic Dysfunction Yes (A)     Est. PA Systolic Pressure 41.64 (A)     Tricuspid Valve Regurgitation MILD     and Transthoracic echo (TTE) complete (Cupid Only): No results found for this or any previous visit.

## 2019-03-19 NOTE — ASSESSMENT & PLAN NOTE
Unclear if chest pain or any anginal equilevante as patient with profound dementia.   EKG unreveleing NSR HR 90s, no acute ST changes with NM 06/2017 negative   - Troponin 0.330, likely in the setting of demand ischemia (hypoxic, HF)    - Trend troponin  - Continue GDMT as tolerated  - Pending limited TTE for WMA, however would manage conservatively as goals of care being discussed, unless STEMI

## 2019-03-20 PROBLEM — E87.6 HYPOKALEMIA: Status: ACTIVE | Noted: 2019-03-20

## 2019-03-20 PROBLEM — E87.3 ALKALOSIS, METABOLIC: Status: ACTIVE | Noted: 2019-03-20

## 2019-03-20 LAB
ALBUMIN SERPL BCP-MCNC: 2.6 G/DL
ALP SERPL-CCNC: 77 U/L
ALT SERPL W/O P-5'-P-CCNC: 8 U/L
ANION GAP SERPL CALC-SCNC: 10 MMOL/L
ANION GAP SERPL CALC-SCNC: 15 MMOL/L
AST SERPL-CCNC: 15 U/L
BASOPHILS # BLD AUTO: 0.01 K/UL
BASOPHILS NFR BLD: 0.1 %
BILIRUB SERPL-MCNC: 1 MG/DL
BUN SERPL-MCNC: 22 MG/DL
BUN SERPL-MCNC: 29 MG/DL
CALCIUM SERPL-MCNC: 8.7 MG/DL
CALCIUM SERPL-MCNC: 8.9 MG/DL
CHLORIDE SERPL-SCNC: 76 MMOL/L
CHLORIDE SERPL-SCNC: 78 MMOL/L
CO2 SERPL-SCNC: 47 MMOL/L
CO2 SERPL-SCNC: 48 MMOL/L
CREAT SERPL-MCNC: 0.7 MG/DL
CREAT SERPL-MCNC: 0.7 MG/DL
DIFFERENTIAL METHOD: ABNORMAL
EOSINOPHIL # BLD AUTO: 0 K/UL
EOSINOPHIL NFR BLD: 0 %
ERYTHROCYTE [DISTWIDTH] IN BLOOD BY AUTOMATED COUNT: 15 %
EST. GFR  (AFRICAN AMERICAN): >60 ML/MIN/1.73 M^2
EST. GFR  (AFRICAN AMERICAN): >60 ML/MIN/1.73 M^2
EST. GFR  (NON AFRICAN AMERICAN): >60 ML/MIN/1.73 M^2
EST. GFR  (NON AFRICAN AMERICAN): >60 ML/MIN/1.73 M^2
GLUCOSE SERPL-MCNC: 115 MG/DL
GLUCOSE SERPL-MCNC: 131 MG/DL
HCT VFR BLD AUTO: 39.6 %
HGB BLD-MCNC: 12.1 G/DL
IMM GRANULOCYTES # BLD AUTO: 0.07 K/UL
IMM GRANULOCYTES NFR BLD AUTO: 0.7 %
INR PPP: 1.1
LYMPHOCYTES # BLD AUTO: 0.5 K/UL
LYMPHOCYTES NFR BLD: 5.1 %
MAGNESIUM SERPL-MCNC: 1.7 MG/DL
MAGNESIUM SERPL-MCNC: 1.8 MG/DL
MCH RBC QN AUTO: 26.2 PG
MCHC RBC AUTO-ENTMCNC: 30.6 G/DL
MCV RBC AUTO: 86 FL
MONOCYTES # BLD AUTO: 0.4 K/UL
MONOCYTES NFR BLD: 3.4 %
NEUTROPHILS # BLD AUTO: 9.2 K/UL
NEUTROPHILS NFR BLD: 90.7 %
NRBC BLD-RTO: 0 /100 WBC
PLATELET # BLD AUTO: 269 K/UL
PMV BLD AUTO: 10.9 FL
POTASSIUM SERPL-SCNC: 2.5 MMOL/L
POTASSIUM SERPL-SCNC: 3.8 MMOL/L
PROT SERPL-MCNC: 5.8 G/DL
PROTHROMBIN TIME: 11.1 SEC
RBC # BLD AUTO: 4.62 M/UL
SODIUM SERPL-SCNC: 136 MMOL/L
SODIUM SERPL-SCNC: 138 MMOL/L
WBC # BLD AUTO: 10.16 K/UL

## 2019-03-20 PROCEDURE — 94640 AIRWAY INHALATION TREATMENT: CPT

## 2019-03-20 PROCEDURE — 85610 PROTHROMBIN TIME: CPT

## 2019-03-20 PROCEDURE — 63600175 PHARM REV CODE 636 W HCPCS: Performed by: HOSPITALIST

## 2019-03-20 PROCEDURE — 83735 ASSAY OF MAGNESIUM: CPT

## 2019-03-20 PROCEDURE — 99233 PR SUBSEQUENT HOSPITAL CARE,LEVL III: ICD-10-PCS | Mod: ,,, | Performed by: HOSPITALIST

## 2019-03-20 PROCEDURE — 25000003 PHARM REV CODE 250: Performed by: HOSPITALIST

## 2019-03-20 PROCEDURE — 85025 COMPLETE CBC W/AUTO DIFF WBC: CPT

## 2019-03-20 PROCEDURE — 99233 SBSQ HOSP IP/OBS HIGH 50: CPT | Mod: ,,, | Performed by: HOSPITALIST

## 2019-03-20 PROCEDURE — 80048 BASIC METABOLIC PNL TOTAL CA: CPT

## 2019-03-20 PROCEDURE — 25000242 PHARM REV CODE 250 ALT 637 W/ HCPCS: Performed by: HOSPITALIST

## 2019-03-20 PROCEDURE — 27000221 HC OXYGEN, UP TO 24 HOURS

## 2019-03-20 PROCEDURE — 94761 N-INVAS EAR/PLS OXIMETRY MLT: CPT

## 2019-03-20 PROCEDURE — 80053 COMPREHEN METABOLIC PANEL: CPT

## 2019-03-20 PROCEDURE — 83735 ASSAY OF MAGNESIUM: CPT | Mod: 91

## 2019-03-20 PROCEDURE — 36415 COLL VENOUS BLD VENIPUNCTURE: CPT

## 2019-03-20 PROCEDURE — 11000001 HC ACUTE MED/SURG PRIVATE ROOM

## 2019-03-20 RX ORDER — POLYETHYLENE GLYCOL 3350 17 G/17G
17 POWDER, FOR SOLUTION ORAL DAILY
Status: DISCONTINUED | OUTPATIENT
Start: 2019-03-20 | End: 2019-03-25 | Stop reason: HOSPADM

## 2019-03-20 RX ORDER — GUAIFENESIN 600 MG/1
1200 TABLET, EXTENDED RELEASE ORAL 2 TIMES DAILY
Status: DISCONTINUED | OUTPATIENT
Start: 2019-03-20 | End: 2019-03-22

## 2019-03-20 RX ORDER — ACETAMINOPHEN 325 MG/1
650 TABLET ORAL EVERY 6 HOURS PRN
Status: DISCONTINUED | OUTPATIENT
Start: 2019-03-20 | End: 2019-03-25 | Stop reason: HOSPADM

## 2019-03-20 RX ORDER — POTASSIUM CHLORIDE 7.45 MG/ML
INJECTION INTRAVENOUS
Status: DISPENSED
Start: 2019-03-20 | End: 2019-03-20

## 2019-03-20 RX ORDER — PREDNISONE 20 MG/1
40 TABLET ORAL DAILY
Status: DISCONTINUED | OUTPATIENT
Start: 2019-03-20 | End: 2019-03-22

## 2019-03-20 RX ORDER — SENNOSIDES 8.6 MG/1
8.6 TABLET ORAL DAILY
Status: DISCONTINUED | OUTPATIENT
Start: 2019-03-20 | End: 2019-03-22

## 2019-03-20 RX ORDER — LANOLIN ALCOHOL/MO/W.PET/CERES
CREAM (GRAM) TOPICAL
Status: DISPENSED
Start: 2019-03-20 | End: 2019-03-20

## 2019-03-20 RX ORDER — LEVOFLOXACIN 750 MG/1
750 TABLET ORAL DAILY
Status: DISCONTINUED | OUTPATIENT
Start: 2019-03-20 | End: 2019-03-24

## 2019-03-20 RX ORDER — ENOXAPARIN SODIUM 100 MG/ML
40 INJECTION SUBCUTANEOUS EVERY 24 HOURS
Status: DISCONTINUED | OUTPATIENT
Start: 2019-03-20 | End: 2019-03-25 | Stop reason: HOSPADM

## 2019-03-20 RX ORDER — LANOLIN ALCOHOL/MO/W.PET/CERES
400 CREAM (GRAM) TOPICAL 2 TIMES DAILY
Status: COMPLETED | OUTPATIENT
Start: 2019-03-20 | End: 2019-03-20

## 2019-03-20 RX ORDER — POTASSIUM CHLORIDE 20 MEQ/1
20 TABLET, EXTENDED RELEASE ORAL
Status: COMPLETED | OUTPATIENT
Start: 2019-03-20 | End: 2019-03-20

## 2019-03-20 RX ORDER — POTASSIUM CHLORIDE 20 MEQ/1
TABLET, EXTENDED RELEASE ORAL
Status: DISPENSED
Start: 2019-03-20 | End: 2019-03-20

## 2019-03-20 RX ORDER — POTASSIUM CHLORIDE 7.45 MG/ML
10 INJECTION INTRAVENOUS
Status: DISCONTINUED | OUTPATIENT
Start: 2019-03-20 | End: 2019-03-20

## 2019-03-20 RX ADMIN — ENOXAPARIN SODIUM 40 MG: 100 INJECTION SUBCUTANEOUS at 05:03

## 2019-03-20 RX ADMIN — DOCUSATE SODIUM 100 MG: 100 CAPSULE, LIQUID FILLED ORAL at 09:03

## 2019-03-20 RX ADMIN — IPRATROPIUM BROMIDE AND ALBUTEROL SULFATE 3 ML: .5; 3 SOLUTION RESPIRATORY (INHALATION) at 12:03

## 2019-03-20 RX ADMIN — IPRATROPIUM BROMIDE AND ALBUTEROL SULFATE 3 ML: .5; 3 SOLUTION RESPIRATORY (INHALATION) at 08:03

## 2019-03-20 RX ADMIN — MAGNESIUM OXIDE TAB 400 MG (241.3 MG ELEMENTAL MG) 400 MG: 400 (241.3 MG) TAB at 09:03

## 2019-03-20 RX ADMIN — POTASSIUM CHLORIDE 20 MEQ: 1500 TABLET, EXTENDED RELEASE ORAL at 12:03

## 2019-03-20 RX ADMIN — IPRATROPIUM BROMIDE AND ALBUTEROL SULFATE 3 ML: .5; 3 SOLUTION RESPIRATORY (INHALATION) at 04:03

## 2019-03-20 RX ADMIN — POTASSIUM CHLORIDE 10 MEQ: 10 INJECTION, SOLUTION INTRAVENOUS at 02:03

## 2019-03-20 RX ADMIN — DOCUSATE SODIUM 100 MG: 100 CAPSULE, LIQUID FILLED ORAL at 08:03

## 2019-03-20 RX ADMIN — SENNOSIDES 8.6 MG: 8.6 TABLET, FILM COATED ORAL at 05:03

## 2019-03-20 RX ADMIN — OSELTAMIVIR PHOSPHATE 75 MG: 75 CAPSULE ORAL at 10:03

## 2019-03-20 RX ADMIN — OSELTAMIVIR PHOSPHATE 75 MG: 75 CAPSULE ORAL at 08:03

## 2019-03-20 RX ADMIN — MAGNESIUM OXIDE TAB 400 MG (241.3 MG ELEMENTAL MG) 400 MG: 400 (241.3 MG) TAB at 10:03

## 2019-03-20 RX ADMIN — CEFEPIME 2 G: 2 INJECTION, POWDER, FOR SOLUTION INTRAVENOUS at 05:03

## 2019-03-20 RX ADMIN — POTASSIUM CHLORIDE 20 MEQ: 1500 TABLET, EXTENDED RELEASE ORAL at 02:03

## 2019-03-20 RX ADMIN — GUAIFENESIN 600 MG: 600 TABLET, EXTENDED RELEASE ORAL at 08:03

## 2019-03-20 RX ADMIN — ASPIRIN 81 MG: 81 TABLET, COATED ORAL at 08:03

## 2019-03-20 RX ADMIN — POLYETHYLENE GLYCOL 3350 17 G: 17 POWDER, FOR SOLUTION ORAL at 05:03

## 2019-03-20 RX ADMIN — VANCOMYCIN HYDROCHLORIDE 1250 MG: 100 INJECTION, POWDER, LYOPHILIZED, FOR SOLUTION INTRAVENOUS at 01:03

## 2019-03-20 RX ADMIN — POTASSIUM CHLORIDE 20 MEQ: 1500 TABLET, EXTENDED RELEASE ORAL at 10:03

## 2019-03-20 RX ADMIN — METHYLPREDNISOLONE SODIUM SUCCINATE 60 MG: 125 INJECTION, POWDER, FOR SOLUTION INTRAMUSCULAR; INTRAVENOUS at 08:03

## 2019-03-20 RX ADMIN — POTASSIUM CHLORIDE 10 MEQ: 10 INJECTION, SOLUTION INTRAVENOUS at 10:03

## 2019-03-20 RX ADMIN — PREDNISONE 40 MG: 20 TABLET ORAL at 09:03

## 2019-03-20 RX ADMIN — POTASSIUM CHLORIDE 10 MEQ: 10 INJECTION, SOLUTION INTRAVENOUS at 12:03

## 2019-03-20 RX ADMIN — IPRATROPIUM BROMIDE AND ALBUTEROL SULFATE 3 ML: .5; 3 SOLUTION RESPIRATORY (INHALATION) at 01:03

## 2019-03-20 RX ADMIN — LEVOFLOXACIN 750 MG: 750 TABLET, FILM COATED ORAL at 02:03

## 2019-03-20 RX ADMIN — GUAIFENESIN 1200 MG: 600 TABLET, EXTENDED RELEASE ORAL at 09:03

## 2019-03-20 RX ADMIN — ESCITALOPRAM OXALATE 20 MG: 5 TABLET, FILM COATED ORAL at 08:03

## 2019-03-20 RX ADMIN — ACETAMINOPHEN 650 MG: 325 TABLET ORAL at 05:03

## 2019-03-20 NOTE — PLAN OF CARE
03/20/19 0944   Discharge Assessment   Assessment Type Discharge Planning Assessment   Confirmed/corrected address and phone number on facesheet? Yes   Assessment information obtained from? Patient;Medical Record   Expected Length of Stay (days) 3   Communicated expected length of stay with patient/caregiver yes   Prior to hospitilization cognitive status: Alert/Oriented   Prior to hospitalization functional status: Assistive Equipment;Needs Assistance   Current cognitive status: Alert/Oriented   Current Functional Status: Assistive Equipment;Needs Assistance   Lives With facility resident   Able to Return to Prior Arrangements yes   Is patient able to care for self after discharge? Yes   Patient's perception of discharge disposition shelter care facility   Readmission Within the Last 30 Days previous discharge plan unsuccessful   If yes, most recent facility name: [facility]   Patient currently being followed by outpatient case management? No   Patient currently receives any other outside agency services? No   Equipment Currently Used at Home wheelchair   Do you have any problems affording any of your prescribed medications? TBD   Is the patient taking medications as prescribed? yes   Does the patient have transportation home? Yes   Transportation Anticipated agency   Does the patient receive services at the [facility]? No   Discharge Plan A Return to nursing home   DME Needed Upon Discharge  none   Patient/Family in Agreement with Plan yes   Readmission Questionnaire   At the time of your discharge, did someone talk to you about what your health problems were? Yes   At the time of discharge, did someone talk to you about what to watch out for regarding worsening of your health problem? Yes   At the time of discharge, did someone talk to you about what to do if you experienced worsening of your health problem? Yes   At the time of discharge, did someone talk to you about which medication to take when  you left the hospital and which ones to stop taking? Yes   At the time of discharge, did someone talk to you about when and where to follow up with a doctor after you left the hospital? Yes   How often do you need to have someone help you when you read instructions, pamphlets, or other written material from your doctor or pharmacy? Often   Do you have problems taking your medications as prescribed? No   Do you have any problems affording any of  your prescribed medications? To be determined   Do you have problems obtaining/receiving your medications? No   Does the patient have transportation to healthcare appointments? Yes   Living Arrangements residential facility   Does the patient have family/friends to help with healtcare needs after discharge? yes   Does your caregiver provide all the help you need? Yes   Are you currently feeling confused? No   Are you currently having problems thinking? No   Are you currently having memory problems? No   Admitted with hypoxia 2/2 COPD and CHF. Resident at [facility]. Plan is to return to the NH. No other DC needs identified.

## 2019-03-20 NOTE — PROGRESS NOTES
[facility] Hospital Medicine  Progress Note    Patient Name: Laura Negron  MRN: [id number]  Patient Class: IP- Inpatient   Admission Date: 3/18/2019  Length of Stay: 2 days  Attending Physician: Mohini Caro MD  Primary Care Provider: Jorge Hair MD    [facility] Medicine Team: [facility] HOSP MED C Mohini Caro MD    Subjective:     Principal Problem:Acute on chronic respiratory failure with hypoxia and hypercapnia    HPI:  79 y/o with COPD on 2L home O2, HFpEF, HTN, dementia (baseline waxing and waning orientation to family and place, never year), DM2 who presents from [facility] where she has been since 2015 with chief complaint of dyspnea. Per her daughter at bedside who provides history given respiratory distress of patient she had recent URI sx's for the past few weeks for which she presented to [facility] 03/13-03/16 and admitted to ICU for respiratory failure thought to be 2/2 COPD exacerbation from influenza A. She was discharged on oral steroids and tamiflu. Per family she again worsened at [facility] for past 2 days and presented to ER there and although notes are not complete was given albuterol and had a chest x-ray showing a right perihilar opacity cannot exclude underlying mass lesion recommend further evaluation with chest CT. This is chronic and has been read as fullness previously. Also showed interstitial pulmonary vascular congestion. Her daughter reports [place] called her the next day and reported she continued to decline so was transferred here for management. Her daughter also reports patient has progressively declined, now completely wheelchair bound and 100% dependent with all of her ADLs, additionally recently noted to have gurgling / choking with po intake. Her daughter is unaware of her medication list.     Initially put on lasix gtt with plans for CCU admission but GOC discussion with CCU fellow was notable for her stated she would not want  any aggressive measures for her mother, she would not want any chest compression, intubation, DCCV, pressor support. However would further like to discuss with her siblings ( 2x brothers / 1x sister) prior to making decision. I had a similar discussion where she states she does not think her mother would want that based on previous things she has said but had never explicitly said it so she wanted to discuss with family.     Hospital Course:  No notes on file    Interval History  Improved today. Congestion improving. Net neg 2 L yesterday (4.6 L since admission). WBC normalized. Now on home 3 L O2. Repleting K today for level of 2.8. Deescalated abx to Levaquin due to negative infectious workup. Changed IV steroids to PO.     Review of Systems   Constitutional: Positive for activity change.   HENT: Positive for congestion. Negative for ear discharge, ear pain, postnasal drip, rhinorrhea, sinus pressure, sinus pain, sneezing, sore throat and tinnitus.    Respiratory: Positive for cough, shortness of breath and wheezing.    Cardiovascular: Positive for leg swelling. Negative for chest pain and palpitations.   Gastrointestinal: Negative for abdominal distention, abdominal pain, blood in stool, constipation and diarrhea.   All other systems reviewed and are negative.    Objective:     Vital Signs (Most Recent):  Temp: 97.5 °F (36.4 °C) (03/20/19 1511)  Pulse: 91 (03/20/19 1511)  Resp: 18 (03/20/19 1511)  BP: 120/70 (03/20/19 1511)  SpO2: 97 % (03/20/19 1511) Vital Signs (24h Range):  Temp:  [97.3 °F (36.3 °C)-98.4 °F (36.9 °C)] 97.5 °F (36.4 °C)  Pulse:  [71-91] 91  Resp:  [16-27] 18  SpO2:  [89 %-100 %] 97 %  BP: (120-164)/(60-72) 120/70     Weight: 84.2 kg (185 lb 10 oz)  Body mass index is 29.96 kg/m².    Physical Exam   Constitutional: She is oriented to person, place, and time. She appears ill. No distress.   HENT:   Head: Normocephalic and atraumatic.   Right Ear: External ear normal.   Left Ear: External ear  normal.   Eyes: Conjunctivae and EOM are normal. Right eye exhibits no discharge. Left eye exhibits no discharge.   Neck: Normal range of motion. Neck supple.   Cardiovascular: Normal rate, regular rhythm and intact distal pulses. Exam reveals no decreased pulses.   Pulmonary/Chest: No accessory muscle usage. No respiratory distress. She has decreased breath sounds in the right lower field and the left lower field. She has no wheezes.   Abdominal: Soft. She exhibits no distension. There is no tenderness. There is no guarding.   Musculoskeletal: She exhibits edema (mild BLE). She exhibits no tenderness.   Neurological: She is alert and oriented to person, place, and time. No sensory deficit.   Skin: Skin is warm and dry. Capillary refill takes 2 to 3 seconds. She is not diaphoretic.   Psychiatric: She is not agitated. Cognition and memory are normal. Cognition and memory are not impaired.         CRANIAL NERVES     CN III, IV, VI   Extraocular motions are normal.        Significant Labs:   CBC:   Recent Labs   Lab 03/18/19  1839 03/19/19  0425 03/20/19  0710   WBC 15.33* 15.31* 10.16   HGB 12.3 12.5 12.1   HCT 39.6 40.7 39.6    250 269     CMP:   Recent Labs   Lab 03/18/19  1839 03/19/19  0425 03/20/19  0710    137 138   K 4.0 3.5 2.5*   CL 89* 83* 76*   CO2 38* 40* 47*   * 148* 131*   BUN 12 11 22   CREATININE 0.6 0.6 0.7   CALCIUM 9.4 9.1 8.7   PROT 6.6 6.8 5.8*   ALBUMIN 2.9* 2.9* 2.6*   BILITOT 0.8 0.8 1.0   ALKPHOS 92 90 77   AST 25 26 15   ALT 11 11 8*   ANIONGAP 9 14 15   EGFRNONAA >60.0 >60.0 >60.0       Significant Imaging: I have reviewed all pertinent imaging results/findings within the past 24 hours.    Assessment/Plan:      * Acute on chronic respiratory failure with hypoxia and hypercapnia    Acute  exacerbation  Acute on chronic diastolic heart failure exacerbation  Influenza infection  Pneumonia  -Likely due to a combination of COPD exacerbation from influenza as well as CHF  exacerbation  -Initially in some respiratory distress and requiring pressure support from BiPAP. She was remarkably well compensated on ABG though breathing 30-35/min. Baseline CO2 appears to be mid 30s  -Repeat flu test negative  -Diuresed well with Lasix gtt which was discontinued due to contraction alkalosis.  -Vancomycin and Cefepime initiated empirically; respiratory culture negative, blood cultures negative, procal negative. Deescalate antibiotics to Levaquin to complete 7 day course  -Continue tamiflu to complete original 5 day course  -Solumedrol 60mg IV BID transitioned to prednisone 40 mg PO daily  -Duonebs q4h  -Now on home 2.5-3 L O2. Afebrile. VSS. Leukocytosis resolved.      Hypokalemia    K 2.8 secondary to diuresis. Replete via PO & IV. Supplement Mg.   Repeat BMP/Mg this PM.        Alkalosis, metabolic    Baseline in 30's likely 2/2 chronic respiratory acidosis from obstructive lung disease.   Elevated to 48 today 2/2 diuresis.  Hold diuretics.        Goals of care, counseling/discussion    -After discussion with patient and family, she has decided to be DNR.       Encephalopathy, metabolic    -Acute on chronic due to infections vs respiratory distress  -Resolved       Demand ischemia    -2/2 CHF exacerbation vs infection   -Trop down-trended       Acute on chronic diastolic congestive heart failure    -See respiratory failure  -Hold home metoprolol and lisinopril, if stable and improving in AM can consider restarting       Influenza A    -See respiratory failure       DM2 (diabetes mellitus, type 2)    -A1C 5. LDSSI + accuchecks       Major depression, single episode    -Continue home SSRI, hold benzo given respiratory distress       Chronic obstructive pulmonary disease with acute exacerbation    -See respiratory failure       Essential hypertension    -Hold home meds         VTE Risk Mitigation (From admission, onward)        Ordered     enoxaparin injection 40 mg  Daily      03/20/19 1540     IP  VTE HIGH RISK PATIENT  Once      03/19/19 0029     Place PAM hose  Until discontinued      03/19/19 0022              Mohini Caro MD  Department of Hospital Medicine   [facility]

## 2019-03-20 NOTE — ASSESSMENT & PLAN NOTE
Acute  exacerbation  Acute on chronic diastolic heart failure exacerbation  Influenza infection  Pneumonia  -Likely due to a combination of COPD exacerbation from influenza as well as CHF exacerbation  -Initially in some respiratory distress and requiring pressure support from BiPAP. She was remarkably well compensated on ABG though breathing 30-35/min. Baseline CO2 appears to be mid 30s  -Repeat flu test negative  -Diuresed well with Lasix gtt which was discontinued due to contraction alkalosis.  -Vancomycin and Cefepime initiated empirically; respiratory culture negative, blood cultures negative, procal negative. Deescalate antibiotics to Levaquin to complete 7 day course  -Continue tamiflu to complete original 5 day course  -Solumedrol 60mg IV BID transitioned to prednisone 40 mg PO daily  -Duonebs q4h  -Now on home 2.5-3 L O2. Afebrile. VSS. Leukocytosis resolved.

## 2019-03-20 NOTE — PLAN OF CARE
Problem: Adult Inpatient Plan of Care  Goal: Plan of Care Review  Outcome: Ongoing (interventions implemented as appropriate)  Plan of care reviewed with pt. Pt. Remains free from falls/trauma/injury. Droplet precautions maintained. K+ and Mg+ replaced. Pt. Tolerating plan of care well. Will continue to monitor.

## 2019-03-20 NOTE — PLAN OF CARE
Problem: Adult Inpatient Plan of Care  Goal: Plan of Care Review  Pt in bed resting. Bed in lowest position, call light in reach, and bed alarm in progress. O2 @3L/NC. Denies pain or discomfort at this time. No skin breakdown noted. Speech clear. [place]. Turned and repositioned pt Q2H. Remains on Droplet precautions. Will continue to monitor.

## 2019-03-20 NOTE — SUBJECTIVE & OBJECTIVE
Interval History      Review of Systems   Constitutional: Positive for activity change.   HENT: Positive for congestion. Negative for ear discharge, ear pain, postnasal drip, rhinorrhea, sinus pressure, sinus pain, sneezing, sore throat and tinnitus.    Respiratory: Positive for cough, shortness of breath and wheezing.    Cardiovascular: Positive for leg swelling. Negative for chest pain and palpitations.   Gastrointestinal: Negative for abdominal distention, abdominal pain, blood in stool, constipation and diarrhea.   All other systems reviewed and are negative.    Objective:     Vital Signs (Most Recent):  Temp: 97.5 °F (36.4 °C) (03/20/19 1511)  Pulse: 91 (03/20/19 1511)  Resp: 18 (03/20/19 1511)  BP: 120/70 (03/20/19 1511)  SpO2: 97 % (03/20/19 1511) Vital Signs (24h Range):  Temp:  [97.3 °F (36.3 °C)-98.4 °F (36.9 °C)] 97.5 °F (36.4 °C)  Pulse:  [71-91] 91  Resp:  [16-27] 18  SpO2:  [89 %-100 %] 97 %  BP: (120-164)/(60-72) 120/70     Weight: 84.2 kg (185 lb 10 oz)  Body mass index is 29.96 kg/m².    Physical Exam   Constitutional: She is oriented to person, place, and time. She appears ill. No distress.   HENT:   Head: Normocephalic and atraumatic.   Right Ear: External ear normal.   Left Ear: External ear normal.   Eyes: Conjunctivae and EOM are normal. Right eye exhibits no discharge. Left eye exhibits no discharge.   Neck: Normal range of motion. Neck supple.   Cardiovascular: Normal rate, regular rhythm and intact distal pulses. Exam reveals no decreased pulses.   Pulmonary/Chest: No accessory muscle usage. No respiratory distress. She has decreased breath sounds in the right lower field and the left lower field. She has no wheezes.   Abdominal: Soft. She exhibits no distension. There is no tenderness. There is no guarding.   Musculoskeletal: She exhibits edema (mild BLE). She exhibits no tenderness.   Neurological: She is alert and oriented to person, place, and time. No sensory deficit.   Skin: Skin is  warm and dry. Capillary refill takes 2 to 3 seconds. She is not diaphoretic.   Psychiatric: She is not agitated. Cognition and memory are normal. Cognition and memory are not impaired.         CRANIAL NERVES     CN III, IV, VI   Extraocular motions are normal.        Significant Labs:   CBC:   Recent Labs   Lab 03/18/19  1839 03/19/19  0425 03/20/19  0710   WBC 15.33* 15.31* 10.16   HGB 12.3 12.5 12.1   HCT 39.6 40.7 39.6    250 269     CMP:   Recent Labs   Lab 03/18/19  1839 03/19/19  0425 03/20/19  0710    137 138   K 4.0 3.5 2.5*   CL 89* 83* 76*   CO2 38* 40* 47*   * 148* 131*   BUN 12 11 22   CREATININE 0.6 0.6 0.7   CALCIUM 9.4 9.1 8.7   PROT 6.6 6.8 5.8*   ALBUMIN 2.9* 2.9* 2.6*   BILITOT 0.8 0.8 1.0   ALKPHOS 92 90 77   AST 25 26 15   ALT 11 11 8*   ANIONGAP 9 14 15   EGFRNONAA >60.0 >60.0 >60.0       Significant Imaging: I have reviewed all pertinent imaging results/findings within the past 24 hours.

## 2019-03-20 NOTE — ASSESSMENT & PLAN NOTE
Baseline in 30's likely 2/2 chronic respiratory acidosis from obstructive lung disease.   Elevated to 48 today 2/2 diuresis.  Hold diuretics.

## 2019-03-21 LAB
ALBUMIN SERPL BCP-MCNC: 2.5 G/DL
ALP SERPL-CCNC: 72 U/L
ALT SERPL W/O P-5'-P-CCNC: 8 U/L
ANION GAP SERPL CALC-SCNC: 10 MMOL/L
AST SERPL-CCNC: 22 U/L
BACTERIA SPEC AEROBE CULT: NORMAL
BASOPHILS # BLD AUTO: 0.02 K/UL
BASOPHILS NFR BLD: 0.2 %
BILIRUB SERPL-MCNC: 0.9 MG/DL
BUN SERPL-MCNC: 26 MG/DL
CALCIUM SERPL-MCNC: 8.9 MG/DL
CHLORIDE SERPL-SCNC: 81 MMOL/L
CO2 SERPL-SCNC: 44 MMOL/L
CREAT SERPL-MCNC: 0.8 MG/DL
DIFFERENTIAL METHOD: ABNORMAL
EOSINOPHIL # BLD AUTO: 0 K/UL
EOSINOPHIL NFR BLD: 0 %
ERYTHROCYTE [DISTWIDTH] IN BLOOD BY AUTOMATED COUNT: 15.2 %
EST. GFR  (AFRICAN AMERICAN): >60 ML/MIN/1.73 M^2
EST. GFR  (NON AFRICAN AMERICAN): >60 ML/MIN/1.73 M^2
GLUCOSE SERPL-MCNC: 131 MG/DL
GRAM STN SPEC: NORMAL
HCT VFR BLD AUTO: 38.3 %
HGB BLD-MCNC: 12.2 G/DL
IMM GRANULOCYTES # BLD AUTO: 0.09 K/UL
IMM GRANULOCYTES NFR BLD AUTO: 0.9 %
LYMPHOCYTES # BLD AUTO: 0.5 K/UL
LYMPHOCYTES NFR BLD: 5.3 %
MAGNESIUM SERPL-MCNC: 2.3 MG/DL
MCH RBC QN AUTO: 26.9 PG
MCHC RBC AUTO-ENTMCNC: 31.9 G/DL
MCV RBC AUTO: 85 FL
MONOCYTES # BLD AUTO: 0.6 K/UL
MONOCYTES NFR BLD: 6.1 %
NEUTROPHILS # BLD AUTO: 8.6 K/UL
NEUTROPHILS NFR BLD: 87.5 %
NRBC BLD-RTO: 0 /100 WBC
PLATELET # BLD AUTO: 230 K/UL
PMV BLD AUTO: 10.4 FL
POTASSIUM SERPL-SCNC: 4.1 MMOL/L
PROT SERPL-MCNC: 5.8 G/DL
RBC # BLD AUTO: 4.53 M/UL
SODIUM SERPL-SCNC: 135 MMOL/L
WBC # BLD AUTO: 9.82 K/UL

## 2019-03-21 PROCEDURE — 99233 PR SUBSEQUENT HOSPITAL CARE,LEVL III: ICD-10-PCS | Mod: ,,, | Performed by: HOSPITALIST

## 2019-03-21 PROCEDURE — 63600175 PHARM REV CODE 636 W HCPCS: Performed by: HOSPITALIST

## 2019-03-21 PROCEDURE — 36415 COLL VENOUS BLD VENIPUNCTURE: CPT

## 2019-03-21 PROCEDURE — 25000003 PHARM REV CODE 250: Performed by: HOSPITALIST

## 2019-03-21 PROCEDURE — 94640 AIRWAY INHALATION TREATMENT: CPT

## 2019-03-21 PROCEDURE — 82803 BLOOD GASES ANY COMBINATION: CPT

## 2019-03-21 PROCEDURE — 36600 WITHDRAWAL OF ARTERIAL BLOOD: CPT

## 2019-03-21 PROCEDURE — 99233 SBSQ HOSP IP/OBS HIGH 50: CPT | Mod: ,,, | Performed by: HOSPITALIST

## 2019-03-21 PROCEDURE — 99900035 HC TECH TIME PER 15 MIN (STAT)

## 2019-03-21 PROCEDURE — 80053 COMPREHEN METABOLIC PANEL: CPT

## 2019-03-21 PROCEDURE — 27000221 HC OXYGEN, UP TO 24 HOURS

## 2019-03-21 PROCEDURE — 85025 COMPLETE CBC W/AUTO DIFF WBC: CPT

## 2019-03-21 PROCEDURE — 94761 N-INVAS EAR/PLS OXIMETRY MLT: CPT

## 2019-03-21 PROCEDURE — 97535 SELF CARE MNGMENT TRAINING: CPT

## 2019-03-21 PROCEDURE — 97161 PT EVAL LOW COMPLEX 20 MIN: CPT

## 2019-03-21 PROCEDURE — 97165 OT EVAL LOW COMPLEX 30 MIN: CPT

## 2019-03-21 PROCEDURE — 83735 ASSAY OF MAGNESIUM: CPT

## 2019-03-21 PROCEDURE — 25000242 PHARM REV CODE 250 ALT 637 W/ HCPCS: Performed by: HOSPITALIST

## 2019-03-21 PROCEDURE — 11000001 HC ACUTE MED/SURG PRIVATE ROOM

## 2019-03-21 RX ADMIN — ESCITALOPRAM OXALATE 20 MG: 5 TABLET, FILM COATED ORAL at 10:03

## 2019-03-21 RX ADMIN — DOCUSATE SODIUM 100 MG: 100 CAPSULE, LIQUID FILLED ORAL at 10:03

## 2019-03-21 RX ADMIN — IPRATROPIUM BROMIDE AND ALBUTEROL SULFATE 3 ML: .5; 3 SOLUTION RESPIRATORY (INHALATION) at 12:03

## 2019-03-21 RX ADMIN — GUAIFENESIN 1200 MG: 600 TABLET, EXTENDED RELEASE ORAL at 10:03

## 2019-03-21 RX ADMIN — IPRATROPIUM BROMIDE AND ALBUTEROL SULFATE 3 ML: .5; 3 SOLUTION RESPIRATORY (INHALATION) at 08:03

## 2019-03-21 RX ADMIN — ASPIRIN 81 MG: 81 TABLET, COATED ORAL at 10:03

## 2019-03-21 RX ADMIN — IPRATROPIUM BROMIDE AND ALBUTEROL SULFATE 3 ML: .5; 3 SOLUTION RESPIRATORY (INHALATION) at 03:03

## 2019-03-21 RX ADMIN — ENOXAPARIN SODIUM 40 MG: 100 INJECTION SUBCUTANEOUS at 04:03

## 2019-03-21 RX ADMIN — LEVOFLOXACIN 750 MG: 750 TABLET, FILM COATED ORAL at 10:03

## 2019-03-21 RX ADMIN — IPRATROPIUM BROMIDE AND ALBUTEROL SULFATE 3 ML: .5; 3 SOLUTION RESPIRATORY (INHALATION) at 04:03

## 2019-03-21 RX ADMIN — SENNOSIDES 8.6 MG: 8.6 TABLET, FILM COATED ORAL at 10:03

## 2019-03-21 RX ADMIN — IPRATROPIUM BROMIDE AND ALBUTEROL SULFATE 3 ML: .5; 3 SOLUTION RESPIRATORY (INHALATION) at 09:03

## 2019-03-21 RX ADMIN — OSELTAMIVIR PHOSPHATE 75 MG: 75 CAPSULE ORAL at 10:03

## 2019-03-21 RX ADMIN — PREDNISONE 40 MG: 20 TABLET ORAL at 10:03

## 2019-03-21 RX ADMIN — POLYETHYLENE GLYCOL 3350 17 G: 17 POWDER, FOR SOLUTION ORAL at 10:03

## 2019-03-21 NOTE — PLAN OF CARE
Problem: Adult Inpatient Plan of Care  Goal: Plan of Care Review  Outcome: Ongoing (interventions implemented as appropriate)  Pt is free fall this shift. No skin breakdown noted. Cardiac monitor in place and no abnormal rhythm noted. No s/s of infection noted. Pt denied pain. Pt aaox4. Afebrile. Vss. Will continue to monitor pt.

## 2019-03-21 NOTE — SUBJECTIVE & OBJECTIVE
Interval History  Feeling better. Remains on home 2.5 L. Alkalosis improving.     Review of Systems   Constitutional: Negative for activity change.   HENT: Positive for congestion. Negative for ear discharge, ear pain, postnasal drip, rhinorrhea, sinus pressure, sinus pain, sneezing, sore throat and tinnitus.    Respiratory: Positive for cough, shortness of breath and wheezing.    Cardiovascular: Positive for leg swelling. Negative for chest pain and palpitations.   Gastrointestinal: Negative for abdominal distention, abdominal pain, blood in stool, constipation and diarrhea.   All other systems reviewed and are negative.    Objective:     Vital Signs (Most Recent):  Temp: 97.2 °F (36.2 °C) (03/21/19 1051)  Pulse: 80 (03/21/19 1447)  Resp: 20 (03/21/19 1229)  BP: (!) 141/65 (03/21/19 1051)  SpO2: 95 % (03/21/19 1229) Vital Signs (24h Range):  Temp:  [97.2 °F (36.2 °C)-97.6 °F (36.4 °C)] 97.2 °F (36.2 °C)  Pulse:  [75-86] 80  Resp:  [18-20] 20  SpO2:  [91 %-98 %] 95 %  BP: (120-142)/(61-65) 141/65     Weight: 84.2 kg (185 lb 10 oz)  Body mass index is 29.96 kg/m².    Physical Exam   Constitutional: She is oriented to person, place, and time. No distress.   HENT:   Head: Normocephalic and atraumatic.   Right Ear: External ear normal.   Left Ear: External ear normal.   Eyes: Conjunctivae and EOM are normal. Right eye exhibits no discharge. Left eye exhibits no discharge.   Neck: Normal range of motion. Neck supple.   Cardiovascular: Normal rate, regular rhythm and intact distal pulses. Exam reveals no decreased pulses.   Pulmonary/Chest: No accessory muscle usage. No respiratory distress. She has decreased breath sounds in the right lower field and the left lower field. She has no wheezes.   Abdominal: Soft. She exhibits no distension. There is no tenderness. There is no guarding.   Musculoskeletal: She exhibits edema (mild BLE). She exhibits no tenderness.   Neurological: She is alert and oriented to person, place,  and time. No sensory deficit.   Skin: Skin is warm and dry. Capillary refill takes 2 to 3 seconds. She is not diaphoretic.   Psychiatric: She is not agitated. Cognition and memory are normal. Cognition and memory are not impaired.         CRANIAL NERVES     CN III, IV, VI   Extraocular motions are normal.        Significant Labs:   CBC:   Recent Labs   Lab 03/20/19  0710 03/21/19  1020   WBC 10.16 9.82   HGB 12.1 12.2   HCT 39.6 38.3    230     CMP:   Recent Labs   Lab 03/20/19  0710 03/20/19  1606 03/21/19  0709    136 135*   K 2.5* 3.8 4.1   CL 76* 78* 81*   CO2 47* 48* 44*   * 115* 131*   BUN 22 29* 26*   CREATININE 0.7 0.7 0.8   CALCIUM 8.7 8.9 8.9   PROT 5.8*  --  5.8*   ALBUMIN 2.6*  --  2.5*   BILITOT 1.0  --  0.9   ALKPHOS 77  --  72   AST 15  --  22   ALT 8*  --  8*   ANIONGAP 15 10 10   EGFRNONAA >60.0 >60.0 >60.0       Significant Imaging: I have reviewed all pertinent imaging results/findings within the past 24 hours.

## 2019-03-21 NOTE — PLAN OF CARE
Problem: Physical Therapy Goal  Goal: Physical Therapy Goal  Pt does not require additional acute PT services at this time d/t baseline c functional mobility.    Pt educated on asking medical staff for PT consult if changes in functional status occurs. - v/u        Outcome: Outcome(s) achieved Date Met: 03/21/19  Eval and D/C from acute PT services    Chucky Peacock, PT,DPT  3/21/2019

## 2019-03-21 NOTE — ASSESSMENT & PLAN NOTE
-See respiratory failure  -Hold home metoprolol and lisinopril, if stable and improving in AM can consider restarting  -Repeat CXR  -Repeat BNP in AM

## 2019-03-21 NOTE — PROGRESS NOTES
[facility] Hospital Medicine  Progress Note    Patient Name: Laura Negron  MRN: [id number]  Patient Class: IP- Inpatient   Admission Date: 3/18/2019  Length of Stay: 3 days  Attending Physician: Mohini Caro MD  Primary Care Provider: Jorge Hair MD    [facility] Medicine Team: [facility] MED C Mohini Caro MD    Subjective:     Principal Problem:Acute on chronic respiratory failure with hypoxia and hypercapnia    HPI:  79 y/o with COPD on 2L home O2, HFpEF, HTN, dementia (baseline waxing and waning orientation to family and place, never year), DM2 who presents from [facility] where she has been since 2015 with chief complaint of dyspnea. Per her daughter at bedside who provides history given respiratory distress of patient she had recent URI sx's for the past few weeks for which she presented to [facility] 03/13-03/16 and admitted to ICU for respiratory failure thought to be 2/2 COPD exacerbation from influenza A. She was discharged on oral steroids and tamiflu. Per family she again worsened at [facility] for past 2 days and presented to ER there and although notes are not complete was given albuterol and had a chest x-ray showing a right perihilar opacity cannot exclude underlying mass lesion recommend further evaluation with chest CT. This is chronic and has been read as fullness previously. Also showed interstitial pulmonary vascular congestion. Her daughter reports [place] called her the next day and reported she continued to decline so was transferred here for management. Her daughter also reports patient has progressively declined, now completely wheelchair bound and 100% dependent with all of her ADLs, additionally recently noted to have gurgling / choking with po intake. Her daughter is unaware of her medication list.     Initially put on lasix gtt with plans for CCU admission but GOC discussion with CCU fellow was notable for her stated she would not want  any aggressive measures for her mother, she would not want any chest compression, intubation, DCCV, pressor support. However would further like to discuss with her siblings ( 2x brothers / 1x sister) prior to making decision. I had a similar discussion where she states she does not think her mother would want that based on previous things she has said but had never explicitly said it so she wanted to discuss with family.     Hospital Course:  No notes on file    Interval History  Feeling better. Remains on home 2.5 L. Alkalosis improving.     Review of Systems   Constitutional: Negative for activity change.   HENT: Positive for congestion. Negative for ear discharge, ear pain, postnasal drip, rhinorrhea, sinus pressure, sinus pain, sneezing, sore throat and tinnitus.    Respiratory: Positive for cough, shortness of breath and wheezing.    Cardiovascular: Positive for leg swelling. Negative for chest pain and palpitations.   Gastrointestinal: Negative for abdominal distention, abdominal pain, blood in stool, constipation and diarrhea.   All other systems reviewed and are negative.    Objective:     Vital Signs (Most Recent):  Temp: 97.2 °F (36.2 °C) (03/21/19 1051)  Pulse: 80 (03/21/19 1447)  Resp: 20 (03/21/19 1229)  BP: (!) 141/65 (03/21/19 1051)  SpO2: 95 % (03/21/19 1229) Vital Signs (24h Range):  Temp:  [97.2 °F (36.2 °C)-97.6 °F (36.4 °C)] 97.2 °F (36.2 °C)  Pulse:  [75-86] 80  Resp:  [18-20] 20  SpO2:  [91 %-98 %] 95 %  BP: (120-142)/(61-65) 141/65     Weight: 84.2 kg (185 lb 10 oz)  Body mass index is 29.96 kg/m².    Physical Exam   Constitutional: She is oriented to person, place, and time. No distress.   HENT:   Head: Normocephalic and atraumatic.   Right Ear: External ear normal.   Left Ear: External ear normal.   Eyes: Conjunctivae and EOM are normal. Right eye exhibits no discharge. Left eye exhibits no discharge.   Neck: Normal range of motion. Neck supple.   Cardiovascular: Normal rate, regular rhythm  and intact distal pulses. Exam reveals no decreased pulses.   Pulmonary/Chest: No accessory muscle usage. No respiratory distress. She has decreased breath sounds in the right lower field and the left lower field. She has no wheezes.   Abdominal: Soft. She exhibits no distension. There is no tenderness. There is no guarding.   Musculoskeletal: She exhibits edema (mild BLE). She exhibits no tenderness.   Neurological: She is alert and oriented to person, place, and time. No sensory deficit.   Skin: Skin is warm and dry. Capillary refill takes 2 to 3 seconds. She is not diaphoretic.   Psychiatric: She is not agitated. Cognition and memory are normal. Cognition and memory are not impaired.         CRANIAL NERVES     CN III, IV, VI   Extraocular motions are normal.        Significant Labs:   CBC:   Recent Labs   Lab 03/20/19  0710 03/21/19  1020   WBC 10.16 9.82   HGB 12.1 12.2   HCT 39.6 38.3    230     CMP:   Recent Labs   Lab 03/20/19  0710 03/20/19  1606 03/21/19  0709    136 135*   K 2.5* 3.8 4.1   CL 76* 78* 81*   CO2 47* 48* 44*   * 115* 131*   BUN 22 29* 26*   CREATININE 0.7 0.7 0.8   CALCIUM 8.7 8.9 8.9   PROT 5.8*  --  5.8*   ALBUMIN 2.6*  --  2.5*   BILITOT 1.0  --  0.9   ALKPHOS 77  --  72   AST 15  --  22   ALT 8*  --  8*   ANIONGAP 15 10 10   EGFRNONAA >60.0 >60.0 >60.0       Significant Imaging: I have reviewed all pertinent imaging results/findings within the past 24 hours.    Assessment/Plan:      * Acute on chronic respiratory failure with hypoxia and hypercapnia    Acute  exacerbation  Acute on chronic diastolic heart failure exacerbation  Influenza infection  Pneumonia  -Likely due to a combination of COPD exacerbation from influenza as well as CHF exacerbation  -Initially in some respiratory distress and requiring pressure support from BiPAP. She was remarkably well compensated on ABG though breathing 30-35/min. Baseline CO2 appears to be mid 30s  -Repeat flu test  negative  -Diuresed well with Lasix gtt which was discontinued due to contraction alkalosis.  -Vancomycin and Cefepime initiated empirically; respiratory culture negative, blood cultures negative, procal negative. Deescalate antibiotics to Levaquin to complete 7 day course  -Continue tamiflu to complete original 5 day course  -Solumedrol 60mg IV BID transitioned to prednisone 40 mg PO daily  -Duonebs q4h  -Now on home 2.5-3 L O2. Afebrile. VSS. Leukocytosis resolved.        Hypokalemia    K 2.8 secondary to diuresis. Replete via PO & IV. Supplement Mg.   Repeat BMP/Mg this PM.        Alkalosis, metabolic    Baseline in 30's likely 2/2 chronic respiratory acidosis from obstructive lung disease.   Elevated 2/2 diuresis.  Improving w/ holding of diuretics; will not plan to give IVF's       Goals of care, counseling/discussion    -After discussion with patient and family, she has decided to be DNR.       Encephalopathy, metabolic    -Acute on chronic due to infections vs respiratory distress  -Resolved       Demand ischemia    -2/2 CHF exacerbation vs infection   -Trop down-trended       Acute on chronic diastolic congestive heart failure    -See respiratory failure  -Hold home metoprolol and lisinopril, if stable and improving in AM can consider restarting  -Repeat CXR  -Repeat BNP in AM     Influenza A    -See respiratory failure       DM2 (diabetes mellitus, type 2)    -A1C 5. LDSSI + accuchecks       Major depression, single episode    -Continue home SSRI, hold benzo given respiratory distress       Chronic obstructive pulmonary disease with acute exacerbation    -See respiratory failure       Essential hypertension    -Hold home meds         VTE Risk Mitigation (From admission, onward)        Ordered     enoxaparin injection 40 mg  Daily      03/20/19 1540     IP VTE HIGH RISK PATIENT  Once      03/19/19 0029     Place PAM hose  Until discontinued      03/19/19 0022              Mohini Caro MD  Department of  Hospital Medicine [facility]

## 2019-03-21 NOTE — ASSESSMENT & PLAN NOTE
Baseline in 30's likely 2/2 chronic respiratory acidosis from obstructive lung disease.   Elevated 2/2 diuresis.  Improving w/ holding of diuretics; will not plan to give IVF's

## 2019-03-21 NOTE — PT/OT/SLP EVAL
"Occupational Therapy   Evaluation and Discharge Note    Name: Laura Negron  MRN: [id number]  Admitting Diagnosis:  Acute on chronic respiratory failure with hypoxia and hypercapnia      Recommendations:     Discharge Recommendations: (Return to a Nursing facility)  Discharge Equipment Recommendations:  none  Barriers to discharge:  None    Assessment:     Laura Negron is a 78 y.o. female with a medical diagnosis of Acute on chronic respiratory failure with hypoxia and hypercapnia. At this time, patient is functioning at their prior level of function and does not require further acute OT services.     Plan:     During this hospitalization, patient does not require further acute OT services.  Please re-consult if situation changes.    · Plan of Care Reviewed with: patient    Subjective     Chief Complaint: "It feels like I have a lump of food in my chest"    Patient/Family Comments/goals: Medical management and discharge.     Occupational Profile:  Living Environment: Nursing home resident for over 1 yr.   Previous level of function: Required Min-Mod assist to complete all adls/iadls from staff.  Transfers bed<>w/c w/ Min A at baseline.   Roles and Routines: Enjoys activities at the home, but no hobbies.   Equipment Used at home:  wheelchair  Assistance upon Discharge: Yes from staff.     Pain/Comfort:  · Pain Rating 1: 0/10  · Pain Addressed 1: Cessation of Activity  · Pain Rating Post-Intervention 1: 0/10    Patients cultural, spiritual, Anglican conflicts given the current situation: no    Objective:     Communicated with: RN prior to session.  Patient found HOB elevated with oxygen, telemetry, peripheral IV upon OT entry to room.    General Precautions: Standard, fall, aspiration, droplet   Orthopedic Precautions:N/A   Braces: N/A     Occupational Performance:    Bed Mobility:    · Patient completed Rolling/Turning to Left with  minimum assistance and moderate assistance  · Patient completed " "Rolling/Turning to Right with minimum assistance and moderate assistance  · Patient completed Scooting/Bridging with moderate assistance  · Patient completed Supine to Sit with moderate assistance  · Patient completed Sit to Supine with moderate assistance and with leg lift    Functional Mobility/Transfers:  · W/C not present so no transfers where attempted.     Activities of Daily Living:  · Pt requires Moderate assistance from facility staff at baseline  · During session Pt requested a drink of water, and was able reach for, hold the glass, and take a sip.  She looked distressed and was asked to cough after she swallowed and had a wet raspy cough w/ yellow tinged sputum.     Cognitive/Visual Perceptual:  Completely cognitively intact adult w/ no glasses worn or present during eval.       Physical Exam:  Balance:    -       seated: Fair +  Skin integrity: Bruising of BUE at forearm Pt reported when asked: "I bruise easily."  Dominant hand:    -       RH  Upper Extremity Range of Motion:     -       Right Upper Extremity: WFL  -       Left Upper Extremity: WFL except limited in shoulder ext.   Upper Extremity Strength:    -       Right Upper Extremity: WFL  -       Left Upper Extremity: WFL   Strength:    -       Right Upper Extremity: WFL  -       Left Upper Extremity: WFL  Fine Motor Coordination:    -       Intact  Gross motor coordination:   WFL    AMPAC 6 Click ADL:  AMPAC Total Score: 20    Treatment & Education:  -Pt edu on OT role/POC  -Importance of OOB activity with staff assistance  -Safety during functional t/f and mobility  - White board updated  - Multiple self care tasks/functional mobility completed-- assistance level noted above  - All questions/concerns answered within OT scope of practice    Medicine team was contacted via secure chat for a speech therapy consult for possible aspiration risk 2* to sign/symptoms at bedside. RN Armando) was notified of the concern " immediately.    Education:    Patient left HOB elevated with all lines intact, call button in reach and RN notified    GOALS:   Multidisciplinary Problems     Occupational Therapy Goals     Not on file          Multidisciplinary Problems (Resolved)        Problem: Occupational Therapy Goal    Goal Priority Disciplines Outcome Interventions   Occupational Therapy Goal   (Resolved)     OT, PT/OT Outcome(s) achieved    Description:  Eval complete.  Pt Min - Mod Assist w/ adls/iadls at baseline.  Skilled OT services not appropriate at this time.                      History:     Past Medical History:   Diagnosis Date    Anemia     Anxiety     Asthma     Constipation     COPD (chronic obstructive pulmonary disease)     Dementia     Dependence on supplemental oxygen     Diabetes mellitus type 2 in obese     Difficulty in walking     Edema     Hypertension     Major depression, single episode     Risk for falls     Thrombocytopenia     Urinary tract infection     Vaginal infection        History reviewed. No pertinent surgical history.    Time Tracking:     OT Date of Treatment: 03/21/19  OT Start Time: 0944  OT Stop Time: 1004  OT Total Time (min): 20 min    Billable Minutes:Evaluation 10 mins  Self Care/Home Management 10 mins    Dagoberto Castro, OT  3/21/2019

## 2019-03-21 NOTE — PT/OT/SLP EVAL
Physical Therapy Evaluation and Discharge Note    Patient Name:  Laura Negron   MRN:  [id number]    Recommendations:     Discharge Recommendations:  (return to NH)   Discharge Equipment Recommendations: none   Barriers to discharge: None    Assessment:     Laura Negron is a 78 y.o. female admitted with a medical diagnosis of Acute on chronic respiratory failure with hypoxia and hypercapnia. .  At this time, patient is functioning at their prior level of function and does not require further acute PT services.     Recent Surgery: * No surgery found *      Plan:     During this hospitalization, patient does not require further acute PT services.  Please re-consult if situation changes.      Subjective     Chief Complaint: none  Patient/Family Comments/goals: return home  Pain/Comfort:  · Pain Rating 1: 0/10    Patients cultural, spiritual, Bahai conflicts given the current situation: no    Living Environment:  Pt living in NH for ~1yr.  PTA no falls  Prior to admission, patients level of function was requiring assistance c ADLs and mobility - transferred to  c assistance from staff.  Equipment used at home: wheelchair.  DME owned (not currently used): none.  Upon discharge, patient will have assistance from staff.    Objective:     Communicated with RN and OT prior to session.  Patient found HOB elevated with peripheral IV, telemetry, pulse ox (continuous), oxygen upon PT entry to room.    General Precautions: Standard, fall, aspiration, droplet   Orthopedic Precautions:N/A   Braces: N/A     Exams:  · Cognitive Exam:  Patient is oriented to Person, Place, Time and Situation  · RLE ROM: WFL  · RLE Strength: WFL  · LLE ROM: WFL  · LLE Strength: WFL    Functional Mobility:  · Bed Mobility:     · Rolling Right: minimum assistance  · Scooting: moderate assistance  · Supine to Sit: moderate assistance  · Sit to Supine: moderate assistance  · Balance: sitting - SBA    AM-PAC 6 CLICK MOBILITY  Total  Score:8       Therapeutic Activities and Exercises:  Pt educated on: PT role/POC; safety c mobility; benefits of OOB activities; performing therex; d/c recs - v/u  -Sat EOB x5min    AM-PAC 6 CLICK MOBILITY  Total Score:8     Patient left HOB elevated with all lines intact, call button in reach and RN notified.    GOALS:   Multidisciplinary Problems     Physical Therapy Goals     Not on file          Multidisciplinary Problems (Resolved)        Problem: Physical Therapy Goal    Goal Priority Disciplines Outcome Goal Variances Interventions   Physical Therapy Goal   (Resolved)     PT, PT/OT Outcome(s) achieved     Description:  Pt does not require additional acute PT services at this time d/t baseline c functional mobility.    Pt educated on asking medical staff for PT consult if changes in functional status occurs. - v/u                          History:     Past Medical History:   Diagnosis Date    Anemia     Anxiety     Asthma     Constipation     COPD (chronic obstructive pulmonary disease)     Dementia     Dependence on supplemental oxygen     Diabetes mellitus type 2 in obese     Difficulty in walking     Edema     Hypertension     Major depression, single episode     Risk for falls     Thrombocytopenia     Urinary tract infection     Vaginal infection        History reviewed. No pertinent surgical history.    Time Tracking:     PT Received On: 03/21/19  PT Start Time: 0942     PT Stop Time: 0957  PT Total Time (min): 15 min     Billable Minutes: Evaluation 15 min      Chucky Peacock PT  03/21/2019

## 2019-03-21 NOTE — PLAN OF CARE
Problem: Occupational Therapy Goal  Goal: Occupational Therapy Goal  Eval complete.  Pt Min - Mod Assist w/ adls/iadls at baseline.  Skilled OT services not appropriate at this time.    Outcome: Outcome(s) achieved Date Met: 03/21/19  Eval complete. Pt tolerated session well. D/C from OT.

## 2019-03-22 LAB
ALBUMIN SERPL BCP-MCNC: 2.8 G/DL
ALLENS TEST: ABNORMAL
ALP SERPL-CCNC: 71 U/L
ALT SERPL W/O P-5'-P-CCNC: 9 U/L
ANION GAP SERPL CALC-SCNC: 6 MMOL/L
ANION GAP SERPL CALC-SCNC: 8 MMOL/L
AST SERPL-CCNC: 16 U/L
BASOPHILS # BLD AUTO: 0.01 K/UL
BASOPHILS NFR BLD: 0.1 %
BILIRUB SERPL-MCNC: 0.9 MG/DL
BNP SERPL-MCNC: 85 PG/ML
BUN SERPL-MCNC: 19 MG/DL
BUN SERPL-MCNC: 21 MG/DL
CALCIUM SERPL-MCNC: 8.8 MG/DL
CALCIUM SERPL-MCNC: 9.4 MG/DL
CHLORIDE SERPL-SCNC: 82 MMOL/L
CHLORIDE SERPL-SCNC: 87 MMOL/L
CO2 SERPL-SCNC: 44 MMOL/L
CO2 SERPL-SCNC: 45 MMOL/L
CREAT SERPL-MCNC: 0.7 MG/DL
CREAT SERPL-MCNC: 0.8 MG/DL
DELSYS: ABNORMAL
DIFFERENTIAL METHOD: ABNORMAL
EOSINOPHIL # BLD AUTO: 0 K/UL
EOSINOPHIL NFR BLD: 0.1 %
ERYTHROCYTE [DISTWIDTH] IN BLOOD BY AUTOMATED COUNT: 15.4 %
EST. GFR  (AFRICAN AMERICAN): >60 ML/MIN/1.73 M^2
EST. GFR  (AFRICAN AMERICAN): >60 ML/MIN/1.73 M^2
EST. GFR  (NON AFRICAN AMERICAN): >60 ML/MIN/1.73 M^2
EST. GFR  (NON AFRICAN AMERICAN): >60 ML/MIN/1.73 M^2
FLOW: 2.5
GLUCOSE SERPL-MCNC: 114 MG/DL
GLUCOSE SERPL-MCNC: 92 MG/DL
HCO3 UR-SCNC: 53.9 MMOL/L (ref 24–28)
HCT VFR BLD AUTO: 39.2 %
HGB BLD-MCNC: 12.2 G/DL
IMM GRANULOCYTES # BLD AUTO: 0.11 K/UL
IMM GRANULOCYTES NFR BLD AUTO: 0.8 %
LYMPHOCYTES # BLD AUTO: 0.7 K/UL
LYMPHOCYTES NFR BLD: 5.3 %
MAGNESIUM SERPL-MCNC: 2.1 MG/DL
MCH RBC QN AUTO: 26.7 PG
MCHC RBC AUTO-ENTMCNC: 31.1 G/DL
MCV RBC AUTO: 86 FL
MODE: ABNORMAL
MONOCYTES # BLD AUTO: 0.7 K/UL
MONOCYTES NFR BLD: 5.5 %
NEUTROPHILS # BLD AUTO: 11.8 K/UL
NEUTROPHILS NFR BLD: 88.2 %
NRBC BLD-RTO: 0 /100 WBC
PCO2 BLDA: 62.1 MMHG (ref 35–45)
PH SMN: 7.55 [PH] (ref 7.35–7.45)
PLATELET # BLD AUTO: 214 K/UL
PMV BLD AUTO: 10.7 FL
PO2 BLDA: 66 MMHG (ref 80–100)
POC BE: >30 MMOL/L
POC SATURATED O2: 94 % (ref 95–100)
POC TCO2: >50 MMOL/L (ref 23–27)
POTASSIUM SERPL-SCNC: 3.1 MMOL/L
POTASSIUM SERPL-SCNC: 5.1 MMOL/L
PROT SERPL-MCNC: 5.8 G/DL
RBC # BLD AUTO: 4.57 M/UL
SAMPLE: ABNORMAL
SITE: ABNORMAL
SODIUM SERPL-SCNC: 135 MMOL/L
SODIUM SERPL-SCNC: 137 MMOL/L
SP02: 95
WBC # BLD AUTO: 13.34 K/UL

## 2019-03-22 PROCEDURE — 85025 COMPLETE CBC W/AUTO DIFF WBC: CPT

## 2019-03-22 PROCEDURE — 25000003 PHARM REV CODE 250: Performed by: HOSPITALIST

## 2019-03-22 PROCEDURE — 94761 N-INVAS EAR/PLS OXIMETRY MLT: CPT

## 2019-03-22 PROCEDURE — 80053 COMPREHEN METABOLIC PANEL: CPT

## 2019-03-22 PROCEDURE — 80048 BASIC METABOLIC PNL TOTAL CA: CPT

## 2019-03-22 PROCEDURE — 99233 PR SUBSEQUENT HOSPITAL CARE,LEVL III: ICD-10-PCS | Mod: ,,, | Performed by: HOSPITALIST

## 2019-03-22 PROCEDURE — 36415 COLL VENOUS BLD VENIPUNCTURE: CPT

## 2019-03-22 PROCEDURE — 27000221 HC OXYGEN, UP TO 24 HOURS

## 2019-03-22 PROCEDURE — 11000001 HC ACUTE MED/SURG PRIVATE ROOM

## 2019-03-22 PROCEDURE — 63600175 PHARM REV CODE 636 W HCPCS: Performed by: HOSPITALIST

## 2019-03-22 PROCEDURE — 92610 EVALUATE SWALLOWING FUNCTION: CPT

## 2019-03-22 PROCEDURE — 83735 ASSAY OF MAGNESIUM: CPT

## 2019-03-22 PROCEDURE — 99233 SBSQ HOSP IP/OBS HIGH 50: CPT | Mod: ,,, | Performed by: HOSPITALIST

## 2019-03-22 PROCEDURE — 25000242 PHARM REV CODE 250 ALT 637 W/ HCPCS: Performed by: HOSPITALIST

## 2019-03-22 PROCEDURE — 83880 ASSAY OF NATRIURETIC PEPTIDE: CPT

## 2019-03-22 PROCEDURE — 94640 AIRWAY INHALATION TREATMENT: CPT

## 2019-03-22 RX ORDER — GUAIFENESIN 100 MG/5ML
400 SOLUTION ORAL EVERY 8 HOURS
Status: DISCONTINUED | OUTPATIENT
Start: 2019-03-22 | End: 2019-03-23

## 2019-03-22 RX ORDER — SODIUM CHLORIDE 9 MG/ML
INJECTION, SOLUTION INTRAVENOUS CONTINUOUS
Status: DISCONTINUED | OUTPATIENT
Start: 2019-03-22 | End: 2019-03-23

## 2019-03-22 RX ORDER — DOCUSATE SODIUM 50 MG/5ML
100 LIQUID ORAL 2 TIMES DAILY
Status: DISCONTINUED | OUTPATIENT
Start: 2019-03-22 | End: 2019-03-25 | Stop reason: HOSPADM

## 2019-03-22 RX ORDER — POTASSIUM CHLORIDE 20 MEQ/15ML
20 SOLUTION ORAL
Status: DISCONTINUED | OUTPATIENT
Start: 2019-03-22 | End: 2019-03-22

## 2019-03-22 RX ORDER — LEVOFLOXACIN 750 MG/1
750 TABLET ORAL DAILY
Qty: 3 TABLET | Refills: 0
Start: 2019-03-23 | End: 2019-03-25 | Stop reason: HOSPADM

## 2019-03-22 RX ORDER — SENNOSIDES 8.8 MG/5ML
5 LIQUID ORAL NIGHTLY
Status: DISCONTINUED | OUTPATIENT
Start: 2019-03-23 | End: 2019-03-25 | Stop reason: HOSPADM

## 2019-03-22 RX ORDER — PREDNISONE 5 MG/ML
40 SOLUTION ORAL DAILY
Status: DISCONTINUED | OUTPATIENT
Start: 2019-03-23 | End: 2019-03-23

## 2019-03-22 RX ORDER — PREDNISONE 20 MG/1
40 TABLET ORAL DAILY
Qty: 8 TABLET | Refills: 0
Start: 2019-03-22 | End: 2019-03-25 | Stop reason: HOSPADM

## 2019-03-22 RX ADMIN — ASPIRIN 81 MG: 81 TABLET, COATED ORAL at 10:03

## 2019-03-22 RX ADMIN — GUAIFENESIN 400 MG: 200 SOLUTION ORAL at 09:03

## 2019-03-22 RX ADMIN — IPRATROPIUM BROMIDE AND ALBUTEROL SULFATE 3 ML: .5; 3 SOLUTION RESPIRATORY (INHALATION) at 04:03

## 2019-03-22 RX ADMIN — DOCUSATE SODIUM 100 MG: 50 LIQUID ORAL at 09:03

## 2019-03-22 RX ADMIN — LEVOFLOXACIN 750 MG: 750 TABLET, FILM COATED ORAL at 10:03

## 2019-03-22 RX ADMIN — GUAIFENESIN 400 MG: 200 SOLUTION ORAL at 01:03

## 2019-03-22 RX ADMIN — ESCITALOPRAM OXALATE 20 MG: 5 TABLET, FILM COATED ORAL at 10:03

## 2019-03-22 RX ADMIN — SENNOSIDES 8.6 MG: 8.6 TABLET, FILM COATED ORAL at 10:03

## 2019-03-22 RX ADMIN — SODIUM CHLORIDE: 0.9 INJECTION, SOLUTION INTRAVENOUS at 12:03

## 2019-03-22 RX ADMIN — POTASSIUM CHLORIDE 20 MEQ: 20 SOLUTION ORAL at 01:03

## 2019-03-22 RX ADMIN — PREDNISONE 40 MG: 20 TABLET ORAL at 10:03

## 2019-03-22 RX ADMIN — IPRATROPIUM BROMIDE AND ALBUTEROL SULFATE 3 ML: .5; 3 SOLUTION RESPIRATORY (INHALATION) at 08:03

## 2019-03-22 RX ADMIN — GUAIFENESIN 1200 MG: 600 TABLET, EXTENDED RELEASE ORAL at 10:03

## 2019-03-22 RX ADMIN — ENOXAPARIN SODIUM 40 MG: 100 INJECTION SUBCUTANEOUS at 05:03

## 2019-03-22 RX ADMIN — IPRATROPIUM BROMIDE AND ALBUTEROL SULFATE 3 ML: .5; 3 SOLUTION RESPIRATORY (INHALATION) at 05:03

## 2019-03-22 RX ADMIN — DOCUSATE SODIUM 100 MG: 100 CAPSULE, LIQUID FILLED ORAL at 10:03

## 2019-03-22 RX ADMIN — IPRATROPIUM BROMIDE AND ALBUTEROL SULFATE 3 ML: .5; 3 SOLUTION RESPIRATORY (INHALATION) at 01:03

## 2019-03-22 RX ADMIN — POLYETHYLENE GLYCOL 3350 17 G: 17 POWDER, FOR SOLUTION ORAL at 10:03

## 2019-03-22 NOTE — PROGRESS NOTES
[facility] Medicine  Progress Note    Patient Name: Laura Negron  MRN: [id number]  Patient Class: IP- Inpatient   Admission Date: 3/18/2019  Length of Stay: 4 days  Attending Physician: Mohini Caro MD  Primary Care Provider: Jorge Hair MD    [facility] Medicine Team: [facility] Mohini Caro MD    Subjective:     Principal Problem:Acute on chronic respiratory failure with hypoxia and hypercapnia    HPI:  77 y/o with COPD on 2L home O2, HFpEF, HTN, dementia (baseline waxing and waning orientation to family and place, never year), DM2 who presents from [facility] where she has been since 2015 with chief complaint of dyspnea. Per her daughter at bedside who provides history given respiratory distress of patient she had recent URI sx's for the past few weeks for which she presented to [facility] 03/13-03/16 and admitted to ICU for respiratory failure thought to be 2/2 COPD exacerbation from influenza A. She was discharged on oral steroids and tamiflu. Per family she again worsened at [facility] for past 2 days and presented to ER there and although notes are not complete was given albuterol and had a chest x-ray showing a right perihilar opacity cannot exclude underlying mass lesion recommend further evaluation with chest CT. This is chronic and has been read as fullness previously. Also showed interstitial pulmonary vascular congestion. Her daughter reports [facility] called her the next day and reported she continued to decline so was transferred here for management. Her daughter also reports patient has progressively declined, now completely wheelchair bound and 100% dependent with all of her ADLs, additionally recently noted to have gurgling / choking with po intake. Her daughter is unaware of her medication list.     Initially put on lasix gtt with plans for CCU admission but GOC discussion with CCU fellow was notable for her stated she would not want  any aggressive measures for her mother, she would not want any chest compression, intubation, DCCV, pressor support. However would further like to discuss with her siblings ( 2x brothers / 1x sister) prior to making decision. I had a similar discussion where she states she does not think her mother would want that based on previous things she has said but had never explicitly said it so she wanted to discuss with family.     Hospital Course:  No notes on file    Interval History  Reports feeling much better but has been having a headache. Remains on home 2.5 L. Starting continuous gentle hydration for metabolic alkalosis. ST eval pending, switched oral meds to solution formulation.  Review of Systems   Constitutional: Negative for activity change.   HENT: Positive for congestion. Negative for ear discharge, ear pain, postnasal drip, rhinorrhea, sinus pressure, sinus pain, sneezing, sore throat and tinnitus.    Respiratory: Positive for cough. Negative for shortness of breath and wheezing.    Cardiovascular: Positive for leg swelling. Negative for chest pain and palpitations.   Gastrointestinal: Negative for abdominal distention, abdominal pain, blood in stool, constipation and diarrhea.   All other systems reviewed and are negative.    Objective:     Vital Signs (Most Recent):  Temp: 98.5 °F (36.9 °C) (03/22/19 1045)  Pulse: 92 (03/22/19 1045)  Resp: (!) 30 (03/22/19 1045)  BP: (!) 143/64 (03/22/19 1045)  SpO2: 100 % (03/22/19 1045) Vital Signs (24h Range):  Temp:  [97.1 °F (36.2 °C)-98.5 °F (36.9 °C)] 98.5 °F (36.9 °C)  Pulse:  [69-92] 92  Resp:  [16-30] 30  SpO2:  [78 %-100 %] 100 %  BP: (135-167)/(64-82) 143/64     Weight: 83.5 kg (184 lb 1.4 oz)  Body mass index is 29.71 kg/m².    Physical Exam   Constitutional: She is oriented to person, place, and time. No distress.   HENT:   Head: Normocephalic and atraumatic.   Right Ear: External ear normal.   Left Ear: External ear normal.   Eyes: Conjunctivae and EOM are  normal. Right eye exhibits no discharge. Left eye exhibits no discharge.   Neck: Normal range of motion. Neck supple.   Cardiovascular: Normal rate, regular rhythm and intact distal pulses. Exam reveals no decreased pulses.   Pulmonary/Chest: No accessory muscle usage. No respiratory distress. She has no decreased breath sounds. She has no wheezes.   Abdominal: Soft. She exhibits no distension. There is no tenderness. There is no guarding.   Musculoskeletal: She exhibits no edema or tenderness.   Neurological: She is alert and oriented to person, place, and time. No sensory deficit.   Skin: Skin is warm and dry. Capillary refill takes 2 to 3 seconds. She is not diaphoretic.   Psychiatric: She is not agitated. Cognition and memory are normal. Cognition and memory are not impaired.         CRANIAL NERVES     CN III, IV, VI   Extraocular motions are normal.        Significant Labs:   CBC:   Recent Labs   Lab 03/21/19  1020 03/22/19  0825   WBC 9.82 13.34*   HGB 12.2 12.2   HCT 38.3 39.2    214     CMP:   Recent Labs   Lab 03/20/19  1606 03/21/19  0709 03/22/19  0825    135* 135*   K 3.8 4.1 3.1*   CL 78* 81* 82*   CO2 48* 44* 45*   * 131* 92   BUN 29* 26* 21   CREATININE 0.7 0.8 0.7   CALCIUM 8.9 8.9 9.4   PROT  --  5.8* 5.8*   ALBUMIN  --  2.5* 2.8*   BILITOT  --  0.9 0.9   ALKPHOS  --  72 71   AST  --  22 16   ALT  --  8* 9*   ANIONGAP 10 10 8   EGFRNONAA >60.0 >60.0 >60.0       Significant Imaging: I have reviewed all pertinent imaging results/findings within the past 24 hours.    Assessment/Plan:      * Acute on chronic respiratory failure with hypoxia and hypercapnia    Acute  exacerbation  Acute on chronic diastolic heart failure exacerbation  Influenza infection  Pneumonia  -Likely due to a combination of COPD exacerbation from influenza as well as CHF exacerbation  -Initially in some respiratory distress and requiring pressure support from BiPAP. She was remarkably well compensated on ABG  though breathing 30-35/min. Baseline CO2 appears to be mid 30s  -Repeat flu test negative  -Diuresed well with Lasix gtt which was discontinued due to contraction alkalosis.  -Vancomycin and Cefepime initiated empirically; respiratory culture negative, blood cultures negative, procal negative. Deescalate antibiotics to Levaquin to complete 7 day course  -Continue tamiflu to complete original 5 day course  -Solumedrol 60mg IV BID transitioned to prednisone 40 mg PO daily  -Duonebs q4h  -Now on home 2.5-3 L O2. Afebrile. VSS. Leukocytosis resolved.        Hypokalemia    K 2.8 secondary to diuresis. Replete via PO & IV. Supplement Mg.   Repeat BMP/Mg this PM.        Alkalosis, metabolic    Baseline in 30's likely 2/2 chronic respiratory acidosis from obstructive lung disease.   Elevated 2/2 diuresis.  Unchanged today despite holding of diuretics; will give gentle hydration and encourage PO intake.        Goals of care, counseling/discussion    -After discussion with patient and family, she has decided to be DNR.       Encephalopathy, metabolic    -Acute on chronic due to infections vs respiratory distress  -Resolved       Demand ischemia    -2/2 CHF exacerbation vs infection   -Trop down-trended       Acute on chronic diastolic congestive heart failure    -See respiratory failure  -Hold home metoprolol and lisinopril, if stable and improving in AM can consider restarting  -Repeat CXR  -Repeat BNP in AM     Influenza A    -See respiratory failure       DM2 (diabetes mellitus, type 2)    -A1C 5. LDSSI + accuchecks       Major depression, single episode    -Continue home SSRI, hold benzo given respiratory distress       Chronic obstructive pulmonary disease with acute exacerbation    -See respiratory failure       Essential hypertension    -Hold home meds         VTE Risk Mitigation (From admission, onward)        Ordered     enoxaparin injection 40 mg  Daily      03/20/19 1540     IP VTE HIGH RISK PATIENT  Once       03/19/19 0029     Place PAM hose  Until discontinued      03/19/19 0022              Mohini Caro MD  Department of [facility] Medicine   [facility]

## 2019-03-22 NOTE — PLAN OF CARE
Problem: Adult Inpatient Plan of Care  Goal: Plan of Care Review  Outcome: Ongoing (interventions implemented as appropriate)  Patient remained in stable condition through shift. Remained free of falls and other injuries. Declined any pain or discomfort. Bed in locked and lowest position, call light in reach, all questions answered, declines any further needs at this time. Will continue to monitor.

## 2019-03-22 NOTE — ASSESSMENT & PLAN NOTE
Baseline in 30's likely 2/2 chronic respiratory acidosis from obstructive lung disease.   Elevated 2/2 diuresis.  Unchanged today despite holding of diuretics; will give gentle hydration and encourage PO intake.

## 2019-03-22 NOTE — PT/OT/SLP EVAL
"Speech Language Pathology Evaluation  Bedside Swallow  Discharge Summary    Patient Name:  Laura Negron   MRN:  [id number]  Admitting Diagnosis: Acute on chronic respiratory failure with hypoxia and hypercapnia    Recommendations:                 General Recommendations:  Follow-up not indicated  Diet recommendations:  Mechanical soft, Thin   Aspiration Precautions: HOB to 90 degrees, Meds crushed in puree per pt preference and Standard aspiration precautions   General Precautions: Standard, fall, aspiration, droplet  Communication strategies:  none    History:     Past Medical History:   Diagnosis Date    Anemia     Anxiety     Asthma     Constipation     COPD (chronic obstructive pulmonary disease)     Dementia     Dependence on supplemental oxygen     Diabetes mellitus type 2 in obese     Difficulty in walking     Edema     Hypertension     Major depression, single episode     Risk for falls     Thrombocytopenia     Urinary tract infection     Vaginal infection        History reviewed. No pertinent surgical history.    Social History: Patient lives in a local NH.    Prior Intubation HX:  None this admission    Modified Barium Swallow: none this admission    Chest X-Rays: 3/21/19:  No significant interval change    Prior diet: soft per pt report    Occupation/hobbies/homemaking: none expressed.    Subjective     Pt was awake and alert in NAD.  She reported no difficulty w/ meds or meals this date and stated that the "food just felt stuck."  yesterday  Patient goals: none expressed     Pain/Comfort:  · Pain Rating 1: 0/10  · Pain Rating Post-Intervention 1: 0/10    Objective:     Oral Musculature Evaluation  · Oral Musculature: WFL  · Dentition: scattered dentition  · Secretion Management: adequate  · Mucosal Quality: adequate  · Mandibular Strength and Mobility: WFL  · Oral Labial Strength and Mobility: WFL  · Lingual Strength and Mobility: WFL  · Buccal Strength and Mobility: WFL  · Volitional " Cough: adequate  · Volitional Swallow: timely  · Voice Prior to PO Intake: clear    Bedside Swallow Eval:   Consistencies Assessed:  · Thin liquids tsp, cup and straw  · Puree tsp bites  · Solids self fed bite     Oral Phase:   · WFL   · Mild extra time for chewing d/t limited dentition  (pt has dentures but does not use them for meals)    Pharyngeal Phase:   · WFL  · no overt clinical signs/symptoms of aspiration  · no overt clinical signs/symptoms of pharyngeal dysphagia    Compensatory Strategies  · None    Treatment: Education was provided to pt re: SLP role, eval results, diet recs, aspiration precautions and SLP POC.  She indicated good understanding and agreed w/ recommendations.    Assessment:     Laura Negron is a 78 y.o. female with an SLP diagnosis of Dysphagia.  She presents with a functional oropharyngeal swallow at this time.  Further Skilled Speech services are not indicated.    Goals:   Multidisciplinary Problems     SLP Goals     Not on file          Multidisciplinary Problems (Resolved)        Problem: SLP Goal    Goal Priority Disciplines Outcome   SLP Goal   (Resolved)     SLP Outcome(s) achieved                   Plan:     · Patient to be seen:      · Plan of Care expires:     · Plan of Care reviewed with:  patient   · SLP Follow-Up:  No       Discharge recommendations:  other (see comments)(return to NH)   Barriers to Discharge:  None    Time Tracking:     SLP Treatment Date:   03/22/19  Speech Start Time:  1122  Speech Stop Time:  1140     Speech Total Time (min):  18 min    Billable Minutes: Eval Swallow and Oral Function 18    Ruba Cuevas CCC-SLP  03/22/2019

## 2019-03-22 NOTE — PLAN OF CARE
Problem: SLP Goal  Goal: SLP Goal  Outcome: Outcome(s) achieved Date Met: 03/22/19  Clinical Swallow Evaluation completed.  Pt presents w/ a functional oropharyngeal swallow at this time w/ no overt signs of aspiration.  REC:  Pt cont po intake w/ Mechanical Soft diet w/ thin liquids, oral meds crushed per pt preference, standard aspiration precautions.  Recs reviewed w/ RN. Further Skilled Speech services are not indicated at this time.  ST to s/o.  Thank you.    Ruba Cuevas, CCC-SLP  3/22/2019

## 2019-03-22 NOTE — PLAN OF CARE
will assist with transfer back to [facility] when appropriate.  Will follow.     03/22/19 0956   Post-Acute Status   Post-Acute Authorization Placement   Post-Acute Placement Status Referrals Sent

## 2019-03-22 NOTE — SUBJECTIVE & OBJECTIVE
Interval History  Reports feeling much better but has been having a headache. Remains on home 2.5 L. Starting continuous gentle hydration for metabolic alkalosis. ST eval pending, switched oral meds to solution formulation.  Review of Systems   Constitutional: Negative for activity change.   HENT: Positive for congestion. Negative for ear discharge, ear pain, postnasal drip, rhinorrhea, sinus pressure, sinus pain, sneezing, sore throat and tinnitus.    Respiratory: Positive for cough. Negative for shortness of breath and wheezing.    Cardiovascular: Positive for leg swelling. Negative for chest pain and palpitations.   Gastrointestinal: Negative for abdominal distention, abdominal pain, blood in stool, constipation and diarrhea.   All other systems reviewed and are negative.    Objective:     Vital Signs (Most Recent):  Temp: 98.5 °F (36.9 °C) (03/22/19 1045)  Pulse: 92 (03/22/19 1045)  Resp: (!) 30 (03/22/19 1045)  BP: (!) 143/64 (03/22/19 1045)  SpO2: 100 % (03/22/19 1045) Vital Signs (24h Range):  Temp:  [97.1 °F (36.2 °C)-98.5 °F (36.9 °C)] 98.5 °F (36.9 °C)  Pulse:  [69-92] 92  Resp:  [16-30] 30  SpO2:  [78 %-100 %] 100 %  BP: (135-167)/(64-82) 143/64     Weight: 83.5 kg (184 lb 1.4 oz)  Body mass index is 29.71 kg/m².    Physical Exam   Constitutional: She is oriented to person, place, and time. No distress.   HENT:   Head: Normocephalic and atraumatic.   Right Ear: External ear normal.   Left Ear: External ear normal.   Eyes: Conjunctivae and EOM are normal. Right eye exhibits no discharge. Left eye exhibits no discharge.   Neck: Normal range of motion. Neck supple.   Cardiovascular: Normal rate, regular rhythm and intact distal pulses. Exam reveals no decreased pulses.   Pulmonary/Chest: No accessory muscle usage. No respiratory distress. She has no decreased breath sounds. She has no wheezes.   Abdominal: Soft. She exhibits no distension. There is no tenderness. There is no guarding.   Musculoskeletal: She  exhibits no edema or tenderness.   Neurological: She is alert and oriented to person, place, and time. No sensory deficit.   Skin: Skin is warm and dry. Capillary refill takes 2 to 3 seconds. She is not diaphoretic.   Psychiatric: She is not agitated. Cognition and memory are normal. Cognition and memory are not impaired.         CRANIAL NERVES     CN III, IV, VI   Extraocular motions are normal.        Significant Labs:   CBC:   Recent Labs   Lab 03/21/19  1020 03/22/19  0825   WBC 9.82 13.34*   HGB 12.2 12.2   HCT 38.3 39.2    214     CMP:   Recent Labs   Lab 03/20/19  1606 03/21/19  0709 03/22/19  0825    135* 135*   K 3.8 4.1 3.1*   CL 78* 81* 82*   CO2 48* 44* 45*   * 131* 92   BUN 29* 26* 21   CREATININE 0.7 0.8 0.7   CALCIUM 8.9 8.9 9.4   PROT  --  5.8* 5.8*   ALBUMIN  --  2.5* 2.8*   BILITOT  --  0.9 0.9   ALKPHOS  --  72 71   AST  --  22 16   ALT  --  8* 9*   ANIONGAP 10 10 8   EGFRNONAA >60.0 >60.0 >60.0       Significant Imaging: I have reviewed all pertinent imaging results/findings within the past 24 hours.

## 2019-03-22 NOTE — PLAN OF CARE
Pt will return to [facility] on tomorrow.   spoke with admissions (Tavia) today.  She will notify Ms. Gongora at the nursing home of return [phone].  Pt will return longterm.     03/22/19 1340   Post-Acute Status   Post-Acute Authorization Placement   Post-Acute Placement Status Referrals Sent

## 2019-03-22 NOTE — PLAN OF CARE
Problem: Adult Inpatient Plan of Care  Goal: Plan of Care Review  Outcome: Ongoing (interventions implemented as appropriate)  Pt free of fall this shift. Pt denied pain. Cardiac monitor in place and no abnormal rhythm noted. No s/s of infection noted. No skin breakdown noted. Pt afebrile. Will continue to moniotr pt.

## 2019-03-22 NOTE — PROGRESS NOTES
Episode of confusion noted in pt. Pt stated that she just went to NH after this nurse left her room the previous time. Nurse then assessed pt orientation and pt was disoriented to place and situation. Pt was able to move upper and lower extremities with no difficulty and pupils response to light. Verbalized no numbness or tingling and sensation intact. Dr. Caro notified and Dr. Caro stated that pt has hx of dementia and intermittent confusion is expected. Dr. Caro said to order delirium precaution for pt.

## 2019-03-23 LAB
ALBUMIN SERPL BCP-MCNC: 2.6 G/DL
ALLENS TEST: ABNORMAL
ALP SERPL-CCNC: 66 U/L
ALT SERPL W/O P-5'-P-CCNC: 10 U/L
ANION GAP SERPL CALC-SCNC: 7 MMOL/L
AST SERPL-CCNC: 20 U/L
BASOPHILS # BLD AUTO: 0.01 K/UL
BASOPHILS NFR BLD: 0.1 %
BILIRUB SERPL-MCNC: 0.9 MG/DL
BNP SERPL-MCNC: 143 PG/ML
BUN SERPL-MCNC: 18 MG/DL
CALCIUM SERPL-MCNC: 8.5 MG/DL
CHLORIDE SERPL-SCNC: 90 MMOL/L
CO2 SERPL-SCNC: 41 MMOL/L
CREAT SERPL-MCNC: 0.7 MG/DL
DELSYS: ABNORMAL
DIFFERENTIAL METHOD: ABNORMAL
EOSINOPHIL # BLD AUTO: 0.1 K/UL
EOSINOPHIL NFR BLD: 0.5 %
ERYTHROCYTE [DISTWIDTH] IN BLOOD BY AUTOMATED COUNT: 15.3 %
EST. GFR  (AFRICAN AMERICAN): >60 ML/MIN/1.73 M^2
EST. GFR  (NON AFRICAN AMERICAN): >60 ML/MIN/1.73 M^2
FLOW: 3
GLUCOSE SERPL-MCNC: 82 MG/DL
HCO3 UR-SCNC: 40.6 MMOL/L (ref 24–28)
HCT VFR BLD AUTO: 37.2 %
HGB BLD-MCNC: 11.7 G/DL
IMM GRANULOCYTES # BLD AUTO: 0.09 K/UL
IMM GRANULOCYTES NFR BLD AUTO: 0.9 %
LYMPHOCYTES # BLD AUTO: 0.8 K/UL
LYMPHOCYTES NFR BLD: 7.5 %
MAGNESIUM SERPL-MCNC: 1.9 MG/DL
MCH RBC QN AUTO: 26.4 PG
MCHC RBC AUTO-ENTMCNC: 31.5 G/DL
MCV RBC AUTO: 84 FL
MODE: ABNORMAL
MONOCYTES # BLD AUTO: 0.6 K/UL
MONOCYTES NFR BLD: 5.9 %
NEUTROPHILS # BLD AUTO: 8.7 K/UL
NEUTROPHILS NFR BLD: 85.1 %
NRBC BLD-RTO: 0 /100 WBC
PCO2 BLDA: 53.4 MMHG (ref 35–45)
PH SMN: 7.49 [PH] (ref 7.35–7.45)
PLATELET # BLD AUTO: 157 K/UL
PMV BLD AUTO: 10.9 FL
PO2 BLDA: 65 MMHG (ref 80–100)
POC BE: 17 MMOL/L
POC SATURATED O2: 94 % (ref 95–100)
POC TCO2: 42 MMOL/L (ref 23–27)
POCT GLUCOSE: 128 MG/DL (ref 70–110)
POTASSIUM SERPL-SCNC: 3.3 MMOL/L
PROT SERPL-MCNC: 5.3 G/DL
RBC # BLD AUTO: 4.44 M/UL
SAMPLE: ABNORMAL
SITE: ABNORMAL
SODIUM SERPL-SCNC: 138 MMOL/L
SP02: 93
WBC # BLD AUTO: 10.18 K/UL

## 2019-03-23 PROCEDURE — 25000003 PHARM REV CODE 250: Performed by: HOSPITALIST

## 2019-03-23 PROCEDURE — 83735 ASSAY OF MAGNESIUM: CPT

## 2019-03-23 PROCEDURE — 99233 PR SUBSEQUENT HOSPITAL CARE,LEVL III: ICD-10-PCS | Mod: ,,, | Performed by: HOSPITALIST

## 2019-03-23 PROCEDURE — 82803 BLOOD GASES ANY COMBINATION: CPT

## 2019-03-23 PROCEDURE — 25000242 PHARM REV CODE 250 ALT 637 W/ HCPCS: Performed by: HOSPITALIST

## 2019-03-23 PROCEDURE — 85025 COMPLETE CBC W/AUTO DIFF WBC: CPT

## 2019-03-23 PROCEDURE — 94668 MNPJ CHEST WALL SBSQ: CPT

## 2019-03-23 PROCEDURE — 36415 COLL VENOUS BLD VENIPUNCTURE: CPT

## 2019-03-23 PROCEDURE — 94761 N-INVAS EAR/PLS OXIMETRY MLT: CPT

## 2019-03-23 PROCEDURE — 83880 ASSAY OF NATRIURETIC PEPTIDE: CPT

## 2019-03-23 PROCEDURE — 99900035 HC TECH TIME PER 15 MIN (STAT)

## 2019-03-23 PROCEDURE — 94667 MNPJ CHEST WALL 1ST: CPT

## 2019-03-23 PROCEDURE — 36600 WITHDRAWAL OF ARTERIAL BLOOD: CPT

## 2019-03-23 PROCEDURE — 80053 COMPREHEN METABOLIC PANEL: CPT

## 2019-03-23 PROCEDURE — 99233 SBSQ HOSP IP/OBS HIGH 50: CPT | Mod: ,,, | Performed by: HOSPITALIST

## 2019-03-23 PROCEDURE — 94664 DEMO&/EVAL PT USE INHALER: CPT

## 2019-03-23 PROCEDURE — 94640 AIRWAY INHALATION TREATMENT: CPT

## 2019-03-23 PROCEDURE — 11000001 HC ACUTE MED/SURG PRIVATE ROOM

## 2019-03-23 PROCEDURE — 63600175 PHARM REV CODE 636 W HCPCS: Performed by: HOSPITALIST

## 2019-03-23 PROCEDURE — 94799 UNLISTED PULMONARY SVC/PX: CPT

## 2019-03-23 PROCEDURE — 27000221 HC OXYGEN, UP TO 24 HOURS

## 2019-03-23 PROCEDURE — 27000646 HC AEROBIKA DEVICE

## 2019-03-23 RX ORDER — POTASSIUM CHLORIDE 20 MEQ/15ML
20 SOLUTION ORAL DAILY
Status: DISCONTINUED | OUTPATIENT
Start: 2019-03-23 | End: 2019-03-25 | Stop reason: HOSPADM

## 2019-03-23 RX ORDER — IPRATROPIUM BROMIDE AND ALBUTEROL SULFATE 2.5; .5 MG/3ML; MG/3ML
3 SOLUTION RESPIRATORY (INHALATION) EVERY 4 HOURS PRN
Status: DISCONTINUED | OUTPATIENT
Start: 2019-03-23 | End: 2019-03-25 | Stop reason: HOSPADM

## 2019-03-23 RX ORDER — PREDNISONE 5 MG/ML
40 SOLUTION ORAL DAILY
Status: DISCONTINUED | OUTPATIENT
Start: 2019-03-23 | End: 2019-03-23

## 2019-03-23 RX ORDER — LISINOPRIL 20 MG/1
20 TABLET ORAL DAILY
Status: DISCONTINUED | OUTPATIENT
Start: 2019-03-23 | End: 2019-03-25 | Stop reason: HOSPADM

## 2019-03-23 RX ORDER — FLUTICASONE FUROATE AND VILANTEROL 100; 25 UG/1; UG/1
1 POWDER RESPIRATORY (INHALATION) DAILY
Status: DISCONTINUED | OUTPATIENT
Start: 2019-03-23 | End: 2019-03-25 | Stop reason: HOSPADM

## 2019-03-23 RX ORDER — GUAIFENESIN 100 MG/5ML
400 SOLUTION ORAL EVERY 4 HOURS
Status: DISCONTINUED | OUTPATIENT
Start: 2019-03-23 | End: 2019-03-25 | Stop reason: HOSPADM

## 2019-03-23 RX ADMIN — IPRATROPIUM BROMIDE AND ALBUTEROL SULFATE 3 ML: .5; 3 SOLUTION RESPIRATORY (INHALATION) at 01:03

## 2019-03-23 RX ADMIN — IPRATROPIUM BROMIDE AND ALBUTEROL SULFATE 3 ML: .5; 3 SOLUTION RESPIRATORY (INHALATION) at 09:03

## 2019-03-23 RX ADMIN — GUAIFENESIN 400 MG: 200 SOLUTION ORAL at 05:03

## 2019-03-23 RX ADMIN — DOCUSATE SODIUM 100 MG: 50 LIQUID ORAL at 09:03

## 2019-03-23 RX ADMIN — LEVOFLOXACIN 750 MG: 750 TABLET, FILM COATED ORAL at 09:03

## 2019-03-23 RX ADMIN — GUAIFENESIN 400 MG: 200 SOLUTION ORAL at 01:03

## 2019-03-23 RX ADMIN — SENNOSIDES 5 ML: 8.8 SYRUP ORAL at 09:03

## 2019-03-23 RX ADMIN — IPRATROPIUM BROMIDE AND ALBUTEROL SULFATE 3 ML: .5; 3 SOLUTION RESPIRATORY (INHALATION) at 04:03

## 2019-03-23 RX ADMIN — METHYLPREDNISOLONE SODIUM SUCCINATE 60 MG: 40 INJECTION, POWDER, FOR SOLUTION INTRAMUSCULAR; INTRAVENOUS at 01:03

## 2019-03-23 RX ADMIN — IPRATROPIUM BROMIDE AND ALBUTEROL SULFATE 3 ML: .5; 3 SOLUTION RESPIRATORY (INHALATION) at 12:03

## 2019-03-23 RX ADMIN — FLUTICASONE FUROATE AND VILANTEROL TRIFENATATE 1 PUFF: 100; 25 POWDER RESPIRATORY (INHALATION) at 01:03

## 2019-03-23 RX ADMIN — IPRATROPIUM BROMIDE AND ALBUTEROL SULFATE 3 ML: .5; 3 SOLUTION RESPIRATORY (INHALATION) at 05:03

## 2019-03-23 RX ADMIN — IPRATROPIUM BROMIDE AND ALBUTEROL SULFATE 3 ML: .5; 3 SOLUTION RESPIRATORY (INHALATION) at 08:03

## 2019-03-23 RX ADMIN — POTASSIUM CHLORIDE 20 MEQ: 20 SOLUTION ORAL at 01:03

## 2019-03-23 RX ADMIN — ENOXAPARIN SODIUM 40 MG: 100 INJECTION SUBCUTANEOUS at 05:03

## 2019-03-23 RX ADMIN — ASPIRIN 81 MG: 81 TABLET, COATED ORAL at 09:03

## 2019-03-23 RX ADMIN — POLYETHYLENE GLYCOL 3350 17 G: 17 POWDER, FOR SOLUTION ORAL at 09:03

## 2019-03-23 RX ADMIN — ESCITALOPRAM OXALATE 20 MG: 5 TABLET, FILM COATED ORAL at 09:03

## 2019-03-23 RX ADMIN — GUAIFENESIN 400 MG: 200 SOLUTION ORAL at 09:03

## 2019-03-23 RX ADMIN — LISINOPRIL 20 MG: 20 TABLET ORAL at 01:03

## 2019-03-23 NOTE — ASSESSMENT & PLAN NOTE
-Acute on chronic due to infections vs respiratory distress  -Has some confusion today. Does have waxing/waning mental status at baseline. Check ABG

## 2019-03-23 NOTE — PROGRESS NOTES
[facility] Hospital Medicine  Progress Note    Patient Name: Laura Negron  MRN: [id number]  Patient Class: IP- Inpatient   Admission Date: 3/18/2019  Length of Stay: 5 days  Attending Physician: Mohini Caro MD  Primary Care Provider: Jorge Hair MD    [facility] Medicine Team: [facility] Mohini Caro MD    Subjective:     Principal Problem:Acute on chronic respiratory failure with hypoxia and hypercapnia    HPI:  79 y/o with COPD on 2L home O2, HFpEF, HTN, dementia (baseline waxing and waning orientation to family and place, never year), DM2 who presents from [facility] where she has been since 2015 with chief complaint of dyspnea. Per her daughter at bedside who provides history given respiratory distress of patient she had recent URI sx's for the past few weeks for which she presented to [facility] 03/13-03/16 and admitted to ICU for respiratory failure thought to be 2/2 COPD exacerbation from influenza A. She was discharged on oral steroids and tamiflu. Per family she again worsened at [facility] for past 2 days and presented to ER there and although notes are not complete was given albuterol and had a chest x-ray showing a right perihilar opacity cannot exclude underlying mass lesion recommend further evaluation with chest CT. This is chronic and has been read as fullness previously. Also showed interstitial pulmonary vascular congestion. Her daughter reports [place] called her the next day and reported she continued to decline so was transferred here for management. Her daughter also reports patient has progressively declined, now completely wheelchair bound and 100% dependent with all of her ADLs, additionally recently noted to have gurgling / choking with po intake. Her daughter is unaware of her medication list.     Initially put on lasix gtt with plans for CCU admission but GOC discussion with CCU fellow was notable for her stated she would not want  any aggressive measures for her mother, she would not want any chest compression, intubation, DCCV, pressor support. However would further like to discuss with her siblings ( 2x brothers / 1x sister) prior to making decision. I had a similar discussion where she states she does not think her mother would want that based on previous things she has said but had never explicitly said it so she wanted to discuss with family.     Hospital Course:  No notes on file    Interval History  With intermittent confusion since yesterday, which is her baseline. States she does not feel well today, unable to answer questions appropriately.     Review of Systems   Unable to perform ROS: Dementia     Objective:     Vital Signs (Most Recent):  Temp: 97.4 °F (36.3 °C) (03/23/19 0913)  Pulse: 100 (03/23/19 0913)  Resp: (!) 27 (03/23/19 0913)  BP: 133/73 (03/23/19 0913)  SpO2: (!) 92 % (03/23/19 0913) Vital Signs (24h Range):  Temp:  [97.4 °F (36.3 °C)-98.3 °F (36.8 °C)] 97.4 °F (36.3 °C)  Pulse:  [] 100  Resp:  [18-27] 27  SpO2:  [92 %-99 %] 92 %  BP: (133-172)/(69-77) 133/73     Weight: 80.9 kg (178 lb 5.6 oz)  Body mass index is 28.79 kg/m².    Physical Exam   Constitutional: No distress.   HENT:   Head: Normocephalic and atraumatic.   Right Ear: External ear normal.   Left Ear: External ear normal.   Eyes: Conjunctivae and EOM are normal. Right eye exhibits no discharge. Left eye exhibits no discharge.   Neck: Normal range of motion. Neck supple.   Cardiovascular: Normal rate, regular rhythm and intact distal pulses. Exam reveals no decreased pulses.   Pulmonary/Chest: No accessory muscle usage. No respiratory distress. She has no decreased breath sounds. She has wheezes.   +upper airway noise   Abdominal: Soft. She exhibits no distension. There is no tenderness. There is no guarding.   Musculoskeletal: She exhibits no edema or tenderness.   Neurological: She is alert. No sensory deficit.   confused   Skin: Skin is warm and  dry. Capillary refill takes 2 to 3 seconds. She is not diaphoretic.   Psychiatric: She is not agitated. Cognition and memory are normal. Cognition and memory are not impaired.         CRANIAL NERVES     CN III, IV, VI   Extraocular motions are normal.        Significant Labs:   CBC:   Recent Labs   Lab 03/22/19  0825 03/23/19  0725   WBC 13.34* 10.18   HGB 12.2 11.7*   HCT 39.2 37.2    157     CMP:   Recent Labs   Lab 03/22/19  0825 03/22/19  1501 03/23/19  0725   * 137 138   K 3.1* 5.1 3.3*   CL 82* 87* 90*   CO2 45* 44* 41*   GLU 92 114* 82   BUN 21 19 18   CREATININE 0.7 0.8 0.7   CALCIUM 9.4 8.8 8.5*   PROT 5.8*  --  5.3*   ALBUMIN 2.8*  --  2.6*   BILITOT 0.9  --  0.9   ALKPHOS 71  --  66   AST 16  --  20   ALT 9*  --  10   ANIONGAP 8 6* 7*   EGFRNONAA >60.0 >60.0 >60.0       Significant Imaging: I have reviewed all pertinent imaging results/findings within the past 24 hours.    Assessment/Plan:      * Acute on chronic respiratory failure with hypoxia and hypercapnia    Acute  exacerbation  Acute on chronic diastolic heart failure exacerbation  Influenza infection  Pneumonia  -Likely due to a combination of COPD exacerbation from influenza as well as CHF exacerbation  -Initially in some respiratory distress and requiring pressure support from BiPAP. She was remarkably well compensated on ABG though breathing 30-35/min. Baseline CO2 appears to be mid 30s  -Repeat flu test negative  -Diuresed well with Lasix gtt which was discontinued due to contraction alkalosis.  -Vancomycin and Cefepime initiated empirically; respiratory culture negative, blood cultures negative, procal negative. Deescalated antibiotics to Levaquin to complete 7 day course  -Completed 5 day Tamiflu course that was started prior to admission  -Solumedrol 60mg IV BID transitioned to prednisone 40 mg PO daily. More wheezing today - change to IV Solumedrol 60 daily.   -Duonebs q4h + prn. Schedule chest PT, acapella, incentive  spirometry  -Now on home 2.5-3 L O2. Afebrile. VSS. Leukocytosis resolved  -Repeat ABG & BNP     Hypokalemia    Resume home KCl 20 meq daily.  Monitor Mg.        Alkalosis, metabolic    Baseline in 30's likely 2/2 chronic respiratory acidosis from obstructive lung disease.   Elevated 2/2 diuresis.  Improved with IVF's.        Encephalopathy, metabolic    -Acute on chronic due to infections vs respiratory distress  -Has some confusion today. Does have waxing/waning mental status at baseline. Check ABG       Goals of care, counseling/discussion    -After discussion with patient and family, she has decided to be DNR.       Demand ischemia    -2/2 CHF exacerbation vs infection   -Trop down-trended       Acute on chronic diastolic congestive heart failure    -See respiratory failure  -Hold home metoprolol and lisinopril, if stable and improving in AM can consider restarting  -Repeat BNP     Influenza A    -See respiratory failure       DM2 (diabetes mellitus, type 2)    -A1C 5. LDSSI + accuchecks       Major depression, single episode    -Continue home SSRI, hold benzo given respiratory distress       Chronic obstructive pulmonary disease with acute exacerbation    -See respiratory failure       Essential hypertension    -Held home meds on admission.  -/71. Resume lisinopril 20 mg daily today.         VTE Risk Mitigation (From admission, onward)        Ordered     enoxaparin injection 40 mg  Daily      03/20/19 1540     IP VTE HIGH RISK PATIENT  Once      03/19/19 0029     Place PAM hose  Until discontinued      03/19/19 0022              Mohini Caro MD  Department of Hospital Medicine   [facility]

## 2019-03-23 NOTE — ASSESSMENT & PLAN NOTE
-See respiratory failure  -Hold home metoprolol and lisinopril, if stable and improving in AM can consider restarting  -Repeat BNP

## 2019-03-23 NOTE — ASSESSMENT & PLAN NOTE
Acute  exacerbation  Acute on chronic diastolic heart failure exacerbation  Influenza infection  Pneumonia  -Likely due to a combination of COPD exacerbation from influenza as well as CHF exacerbation  -Initially in some respiratory distress and requiring pressure support from BiPAP. She was remarkably well compensated on ABG though breathing 30-35/min. Baseline CO2 appears to be mid 30s  -Repeat flu test negative  -Diuresed well with Lasix gtt which was discontinued due to contraction alkalosis.  -Vancomycin and Cefepime initiated empirically; respiratory culture negative, blood cultures negative, procal negative. Deescalated antibiotics to Levaquin to complete 7 day course  -Completed 5 day Tamiflu course that was started prior to admission  -Solumedrol 60mg IV BID transitioned to prednisone 40 mg PO daily. More wheezing today - change to IV Solumedrol 60 daily.   -Duonebs q4h + prn. Schedule chest PT, acapella, incentive spirometry  -Now on home 2.5-3 L O2. Afebrile. VSS. Leukocytosis resolved  -Repeat ABG & BNP

## 2019-03-23 NOTE — ASSESSMENT & PLAN NOTE
Baseline in 30's likely 2/2 chronic respiratory acidosis from obstructive lung disease.   Elevated 2/2 diuresis.  Improved with IVF's.

## 2019-03-23 NOTE — PLAN OF CARE
Problem: Adult Inpatient Plan of Care  Goal: Plan of Care Review  Outcome: Ongoing (interventions implemented as appropriate)  VSS. Orientation waxes and wanes. AAO x 3 at last check. O2 saturation WDL on 2 LPM O2 via NC, throughout shift. Pt encouraged to cough, productive cough when encouraged. Neb treatments administered by RT as per order. NSR on Tele.  1 large bm at start of shift. Callbell placed within reach and use encouraged. Bed alarm maintained.     Problem: Skin Injury Risk Increased  Goal: Skin Health and Integrity  Outcome: Ongoing (interventions implemented as appropriate)  Pt encouraged to turn Q 2 hrs , pillow in place to help offload . Incontinent pad changed as needed. Purewick in place.

## 2019-03-23 NOTE — SUBJECTIVE & OBJECTIVE
Interval History  With intermittent confusion since yesterday, which is her baseline. States she does not feel well today, unable to answer questions appropriately.     Review of Systems   Unable to perform ROS: Dementia     Objective:     Vital Signs (Most Recent):  Temp: 97.4 °F (36.3 °C) (03/23/19 0913)  Pulse: 100 (03/23/19 0913)  Resp: (!) 27 (03/23/19 0913)  BP: 133/73 (03/23/19 0913)  SpO2: (!) 92 % (03/23/19 0913) Vital Signs (24h Range):  Temp:  [97.4 °F (36.3 °C)-98.3 °F (36.8 °C)] 97.4 °F (36.3 °C)  Pulse:  [] 100  Resp:  [18-27] 27  SpO2:  [92 %-99 %] 92 %  BP: (133-172)/(69-77) 133/73     Weight: 80.9 kg (178 lb 5.6 oz)  Body mass index is 28.79 kg/m².    Physical Exam   Constitutional: No distress.   HENT:   Head: Normocephalic and atraumatic.   Right Ear: External ear normal.   Left Ear: External ear normal.   Eyes: Conjunctivae and EOM are normal. Right eye exhibits no discharge. Left eye exhibits no discharge.   Neck: Normal range of motion. Neck supple.   Cardiovascular: Normal rate, regular rhythm and intact distal pulses. Exam reveals no decreased pulses.   Pulmonary/Chest: No accessory muscle usage. No respiratory distress. She has no decreased breath sounds. She has wheezes.   +upper airway noise   Abdominal: Soft. She exhibits no distension. There is no tenderness. There is no guarding.   Musculoskeletal: She exhibits no edema or tenderness.   Neurological: She is alert. No sensory deficit.   confused   Skin: Skin is warm and dry. Capillary refill takes 2 to 3 seconds. She is not diaphoretic.   Psychiatric: She is not agitated. Cognition and memory are normal. Cognition and memory are not impaired.         CRANIAL NERVES     CN III, IV, VI   Extraocular motions are normal.        Significant Labs:   CBC:   Recent Labs   Lab 03/22/19  0825 03/23/19  0725   WBC 13.34* 10.18   HGB 12.2 11.7*   HCT 39.2 37.2    157     CMP:   Recent Labs   Lab 03/22/19  0825 03/22/19  1509  03/23/19  0725   * 137 138   K 3.1* 5.1 3.3*   CL 82* 87* 90*   CO2 45* 44* 41*   GLU 92 114* 82   BUN 21 19 18   CREATININE 0.7 0.8 0.7   CALCIUM 9.4 8.8 8.5*   PROT 5.8*  --  5.3*   ALBUMIN 2.8*  --  2.6*   BILITOT 0.9  --  0.9   ALKPHOS 71  --  66   AST 16  --  20   ALT 9*  --  10   ANIONGAP 8 6* 7*   EGFRNONAA >60.0 >60.0 >60.0       Significant Imaging: I have reviewed all pertinent imaging results/findings within the past 24 hours.

## 2019-03-24 LAB
ALBUMIN SERPL BCP-MCNC: 2.7 G/DL (ref 3.5–5.2)
ALP SERPL-CCNC: 64 U/L (ref 55–135)
ALT SERPL W/O P-5'-P-CCNC: 13 U/L (ref 10–44)
ANION GAP SERPL CALC-SCNC: 11 MMOL/L (ref 8–16)
AST SERPL-CCNC: 20 U/L (ref 10–40)
BACTERIA BLD CULT: NORMAL
BACTERIA BLD CULT: NORMAL
BASOPHILS # BLD AUTO: 0.02 K/UL (ref 0–0.2)
BASOPHILS NFR BLD: 0.2 % (ref 0–1.9)
BILIRUB SERPL-MCNC: 1 MG/DL (ref 0.1–1)
BUN SERPL-MCNC: 19 MG/DL (ref 8–23)
CALCIUM SERPL-MCNC: 8.5 MG/DL (ref 8.7–10.5)
CHLORIDE SERPL-SCNC: 93 MMOL/L (ref 95–110)
CO2 SERPL-SCNC: 34 MMOL/L (ref 23–29)
CREAT SERPL-MCNC: 0.6 MG/DL (ref 0.5–1.4)
DIFFERENTIAL METHOD: ABNORMAL
EOSINOPHIL # BLD AUTO: 0 K/UL (ref 0–0.5)
EOSINOPHIL NFR BLD: 0.3 % (ref 0–8)
ERYTHROCYTE [DISTWIDTH] IN BLOOD BY AUTOMATED COUNT: 15.6 % (ref 11.5–14.5)
EST. GFR  (AFRICAN AMERICAN): >60 ML/MIN/1.73 M^2
EST. GFR  (NON AFRICAN AMERICAN): >60 ML/MIN/1.73 M^2
GLUCOSE SERPL-MCNC: 91 MG/DL (ref 70–110)
HCT VFR BLD AUTO: 36.7 % (ref 37–48.5)
HGB BLD-MCNC: 11 G/DL (ref 12–16)
IMM GRANULOCYTES # BLD AUTO: 0.12 K/UL (ref 0–0.04)
IMM GRANULOCYTES NFR BLD AUTO: 1.3 % (ref 0–0.5)
LYMPHOCYTES # BLD AUTO: 0.7 K/UL (ref 1–4.8)
LYMPHOCYTES NFR BLD: 7.7 % (ref 18–48)
MAGNESIUM SERPL-MCNC: 2 MG/DL (ref 1.6–2.6)
MCH RBC QN AUTO: 26.3 PG (ref 27–31)
MCHC RBC AUTO-ENTMCNC: 30 G/DL (ref 32–36)
MCV RBC AUTO: 88 FL (ref 82–98)
MONOCYTES # BLD AUTO: 0.6 K/UL (ref 0.3–1)
MONOCYTES NFR BLD: 5.9 % (ref 4–15)
NEUTROPHILS # BLD AUTO: 8.1 K/UL (ref 1.8–7.7)
NEUTROPHILS NFR BLD: 84.6 % (ref 38–73)
NRBC BLD-RTO: 0 /100 WBC
PLATELET # BLD AUTO: 162 K/UL (ref 150–350)
PMV BLD AUTO: 10.7 FL (ref 9.2–12.9)
POTASSIUM SERPL-SCNC: 3.8 MMOL/L (ref 3.5–5.1)
PROT SERPL-MCNC: 5.4 G/DL (ref 6–8.4)
RBC # BLD AUTO: 4.18 M/UL (ref 4–5.4)
SODIUM SERPL-SCNC: 138 MMOL/L (ref 136–145)
WBC # BLD AUTO: 9.55 K/UL (ref 3.9–12.7)

## 2019-03-24 PROCEDURE — 94668 MNPJ CHEST WALL SBSQ: CPT

## 2019-03-24 PROCEDURE — 63600175 PHARM REV CODE 636 W HCPCS: Performed by: HOSPITALIST

## 2019-03-24 PROCEDURE — 36415 COLL VENOUS BLD VENIPUNCTURE: CPT

## 2019-03-24 PROCEDURE — 25000003 PHARM REV CODE 250: Performed by: HOSPITALIST

## 2019-03-24 PROCEDURE — 11000001 HC ACUTE MED/SURG PRIVATE ROOM

## 2019-03-24 PROCEDURE — 99233 SBSQ HOSP IP/OBS HIGH 50: CPT | Mod: ,,, | Performed by: HOSPITALIST

## 2019-03-24 PROCEDURE — 99233 PR SUBSEQUENT HOSPITAL CARE,LEVL III: ICD-10-PCS | Mod: ,,, | Performed by: HOSPITALIST

## 2019-03-24 PROCEDURE — 94761 N-INVAS EAR/PLS OXIMETRY MLT: CPT

## 2019-03-24 PROCEDURE — 83735 ASSAY OF MAGNESIUM: CPT

## 2019-03-24 PROCEDURE — 80053 COMPREHEN METABOLIC PANEL: CPT

## 2019-03-24 PROCEDURE — 94640 AIRWAY INHALATION TREATMENT: CPT

## 2019-03-24 PROCEDURE — 85025 COMPLETE CBC W/AUTO DIFF WBC: CPT

## 2019-03-24 PROCEDURE — 99900035 HC TECH TIME PER 15 MIN (STAT)

## 2019-03-24 PROCEDURE — 25000242 PHARM REV CODE 250 ALT 637 W/ HCPCS: Performed by: HOSPITALIST

## 2019-03-24 PROCEDURE — 94664 DEMO&/EVAL PT USE INHALER: CPT

## 2019-03-24 PROCEDURE — 27000221 HC OXYGEN, UP TO 24 HOURS

## 2019-03-24 RX ORDER — FUROSEMIDE 10 MG/ML
40 INJECTION INTRAMUSCULAR; INTRAVENOUS ONCE
Status: COMPLETED | OUTPATIENT
Start: 2019-03-24 | End: 2019-03-24

## 2019-03-24 RX ADMIN — ESCITALOPRAM OXALATE 20 MG: 5 TABLET, FILM COATED ORAL at 09:03

## 2019-03-24 RX ADMIN — ENOXAPARIN SODIUM 40 MG: 100 INJECTION SUBCUTANEOUS at 05:03

## 2019-03-24 RX ADMIN — ASPIRIN 81 MG: 81 TABLET, COATED ORAL at 09:03

## 2019-03-24 RX ADMIN — FLUTICASONE FUROATE AND VILANTEROL TRIFENATATE 1 PUFF: 100; 25 POWDER RESPIRATORY (INHALATION) at 09:03

## 2019-03-24 RX ADMIN — POLYETHYLENE GLYCOL 3350 17 G: 17 POWDER, FOR SOLUTION ORAL at 09:03

## 2019-03-24 RX ADMIN — DOCUSATE SODIUM 100 MG: 50 LIQUID ORAL at 09:03

## 2019-03-24 RX ADMIN — GUAIFENESIN 400 MG: 200 SOLUTION ORAL at 05:03

## 2019-03-24 RX ADMIN — SENNOSIDES 5 ML: 8.8 SYRUP ORAL at 09:03

## 2019-03-24 RX ADMIN — IPRATROPIUM BROMIDE AND ALBUTEROL SULFATE 3 ML: .5; 3 SOLUTION RESPIRATORY (INHALATION) at 08:03

## 2019-03-24 RX ADMIN — LISINOPRIL 20 MG: 20 TABLET ORAL at 09:03

## 2019-03-24 RX ADMIN — IPRATROPIUM BROMIDE AND ALBUTEROL SULFATE 3 ML: .5; 3 SOLUTION RESPIRATORY (INHALATION) at 12:03

## 2019-03-24 RX ADMIN — FUROSEMIDE 40 MG: 10 INJECTION, SOLUTION INTRAMUSCULAR; INTRAVENOUS at 12:03

## 2019-03-24 RX ADMIN — IPRATROPIUM BROMIDE AND ALBUTEROL SULFATE 3 ML: .5; 3 SOLUTION RESPIRATORY (INHALATION) at 04:03

## 2019-03-24 RX ADMIN — GUAIFENESIN 400 MG: 200 SOLUTION ORAL at 09:03

## 2019-03-24 RX ADMIN — POTASSIUM CHLORIDE 20 MEQ: 20 SOLUTION ORAL at 09:03

## 2019-03-24 NOTE — PLAN OF CARE
Problem: Adult Inpatient Plan of Care  Goal: Plan of Care Review  Outcome: Ongoing (interventions implemented as appropriate)  No acute events throughout shift.  Vitals and assessment completed as ordered. Pt calm and cooperative. No complains of pain.  Pt free from injury or falls

## 2019-03-24 NOTE — ASSESSMENT & PLAN NOTE
-Acute on chronic due to infections vs respiratory distress  -Does have waxing/waning mental status at baseline.

## 2019-03-24 NOTE — PROGRESS NOTES
[facility] Hospital Medicine  Progress Note    Patient Name: Laura Negron  MRN: [id number]  Patient Class: IP- Inpatient   Admission Date: 3/18/2019  Length of Stay: 6 days  Attending Physician: Mohini Caro MD  Primary Care Provider: Jorge Hair MD    [facility] Medicine Team: [facility] MED C Mohini Caro MD    Subjective:     Principal Problem:Acute on chronic respiratory failure with hypoxia and hypercapnia    HPI:  77 y/o with COPD on 2L home O2, HFpEF, HTN, dementia (baseline waxing and waning orientation to family and place, never year), DM2 who presents from [facility] where she has been since 2015 with chief complaint of dyspnea. Per her daughter at bedside who provides history given respiratory distress of patient she had recent URI sx's for the past few weeks for which she presented to [facility] 03/13-03/16 and admitted to ICU for respiratory failure thought to be 2/2 COPD exacerbation from influenza A. She was discharged on oral steroids and tamiflu. Per family she again worsened at [facility] for past 2 days and presented to ER there and although notes are not complete was given albuterol and had a chest x-ray showing a right perihilar opacity cannot exclude underlying mass lesion recommend further evaluation with chest CT. This is chronic and has been read as fullness previously. Also showed interstitial pulmonary vascular congestion. Her daughter reports [place] called her the next day and reported she continued to decline so was transferred here for management. Her daughter also reports patient has progressively declined, now completely wheelchair bound and 100% dependent with all of her ADLs, additionally recently noted to have gurgling / choking with po intake. Her daughter is unaware of her medication list.     Initially put on lasix gtt with plans for CCU admission but GOC discussion with CCU fellow was notable for her stated she would not want  any aggressive measures for her mother, she would not want any chest compression, intubation, DCCV, pressor support. However would further like to discuss with her siblings ( 2x brothers / 1x sister) prior to making decision. I had a similar discussion where she states she does not think her mother would want that based on previous things she has said but had never explicitly said it so she wanted to discuss with family.     Hospital Course:  No notes on file    Interval History  Oriented to self and place only. Follows commands. Denies any complaints.     Review of Systems   Unable to perform ROS: Dementia     Objective:     Vital Signs (Most Recent):  Temp: 97.2 °F (36.2 °C) (03/24/19 1153)  Pulse: 80 (03/24/19 1204)  Resp: (!) 24 (03/24/19 1204)  BP: (!) 144/69 (03/24/19 1153)  SpO2: 99 % (03/24/19 1204) Vital Signs (24h Range):  Temp:  [97.2 °F (36.2 °C)-98.8 °F (37.1 °C)] 97.2 °F (36.2 °C)  Pulse:  [] 80  Resp:  [16-28] 24  SpO2:  [92 %-99 %] 99 %  BP: (136-145)/(69-79) 144/69     Weight: 82.3 kg (181 lb 7 oz)  Body mass index is 29.28 kg/m².    Physical Exam   Constitutional: No distress.   HENT:   Head: Normocephalic and atraumatic.   Right Ear: External ear normal.   Left Ear: External ear normal.   Eyes: Conjunctivae and EOM are normal. Right eye exhibits no discharge. Left eye exhibits no discharge.   Neck: Normal range of motion. Neck supple.   Cardiovascular: Normal rate, regular rhythm and intact distal pulses. Exam reveals no decreased pulses.   Pulmonary/Chest: No accessory muscle usage. No respiratory distress. She has no decreased breath sounds. She has wheezes.   +upper airway noise   Abdominal: Soft. She exhibits no distension. There is no tenderness. There is no guarding.   Musculoskeletal: She exhibits no edema or tenderness.   Neurological: She is alert. No sensory deficit.   confused   Skin: Skin is warm and dry. Capillary refill takes 2 to 3 seconds. She is not diaphoretic.    Psychiatric: She is not agitated. Cognition and memory are normal. Cognition and memory are not impaired.         CRANIAL NERVES     CN III, IV, VI   Extraocular motions are normal.        Significant Labs:   CBC:   Recent Labs   Lab 03/23/19  0725 03/24/19  0743   WBC 10.18 9.55   HGB 11.7* 11.0*   HCT 37.2 36.7*    162     CMP:   Recent Labs   Lab 03/22/19  1501 03/23/19  0725 03/24/19  0743    138 138   K 5.1 3.3* 3.8   CL 87* 90* 93*   CO2 44* 41* 34*   * 82 91   BUN 19 18 19   CREATININE 0.8 0.7 0.6   CALCIUM 8.8 8.5* 8.5*   PROT  --  5.3* 5.4*   ALBUMIN  --  2.6* 2.7*   BILITOT  --  0.9 1.0   ALKPHOS  --  66 64   AST  --  20 20   ALT  --  10 13   ANIONGAP 6* 7* 11   EGFRNONAA >60.0 >60.0 >60.0       Significant Imaging: I have reviewed all pertinent imaging results/findings within the past 24 hours.    Assessment/Plan:      * Acute on chronic respiratory failure with hypoxia and hypercapnia  Acute  exacerbation  Acute on chronic diastolic heart failure exacerbation  Influenza infection  Pneumonia  -Likely due to a combination of COPD exacerbation from influenza as well as CHF exacerbation  -Initially in some respiratory distress and requiring pressure support from BiPAP. She was remarkably well compensated on ABG though breathing 30-35/min. Baseline CO2 appears to be mid 30s  -Repeat flu test negative  -Diuresed well with Lasix gtt which was discontinued due to contraction alkalosis.  -Vancomycin and Cefepime initiated empirically; respiratory culture negative, blood cultures negative, procal negative. Deescalated antibiotics to Levaquin to complete 7 day course  -Completed 5 day Tamiflu course that was started prior to admission  -Solumedrol 60mg IV BID transitioned to prednisone 40 mg PO daily.  -Duonebs q4h + prn. Schedule chest PT, acapella, incentive spirometry  -Now on home 2.5-3 L O2. Afebrile. VSS. Leukocytosis resolved  -Repeat ABG improved    Hypokalemia  Resume home KCl 20  meq daily.  Monitor Mg.       Alkalosis, metabolic  Baseline in 30's likely 2/2 chronic respiratory acidosis from obstructive lung disease.   Elevated 2/2 diuresis.  Improved with IVF's.       Encephalopathy, metabolic  -Acute on chronic due to infections vs respiratory distress  -Does have waxing/waning mental status at baseline.      Goals of care, counseling/discussion  -After discussion with patient and family, she has decided to be DNR.      Demand ischemia  -2/2 CHF exacerbation vs infection   -Trop down-trended      Acute on chronic diastolic congestive heart failure  -See respiratory failure  -Hold home metoprolol and lisinopril, if stable and improving in AM can consider restarting  -Repeat BNP mildly elevated. Give one dose of IV Lasix today.     Influenza A  -See respiratory failure      DM2 (diabetes mellitus, type 2)  -A1C 5. LDSSI + accuchecks      Major depression, single episode  -Continue home SSRI, hold benzo given respiratory distress      Chronic obstructive pulmonary disease with acute exacerbation  -See respiratory failure      Essential hypertension  -Held home meds on admission.  -/71. Resume lisinopril 20 mg daily today.        VTE Risk Mitigation (From admission, onward)        Ordered     enoxaparin injection 40 mg  Daily      03/20/19 1540     IP VTE HIGH RISK PATIENT  Once      03/19/19 0029     Place PAM hose  Until discontinued      03/19/19 0022              Mohini Caro MD  Department of Hospital Medicine   [facility]

## 2019-03-24 NOTE — ASSESSMENT & PLAN NOTE
Acute  exacerbation  Acute on chronic diastolic heart failure exacerbation  Influenza infection  Pneumonia  -Likely due to a combination of COPD exacerbation from influenza as well as CHF exacerbation  -Initially in some respiratory distress and requiring pressure support from BiPAP. She was remarkably well compensated on ABG though breathing 30-35/min. Baseline CO2 appears to be mid 30s  -Repeat flu test negative  -Diuresed well with Lasix gtt which was discontinued due to contraction alkalosis.  -Vancomycin and Cefepime initiated empirically; respiratory culture negative, blood cultures negative, procal negative. Deescalated antibiotics to Levaquin to complete 7 day course  -Completed 5 day Tamiflu course that was started prior to admission  -Solumedrol 60mg IV BID transitioned to prednisone 40 mg PO daily.  -Duonebs q4h + prn. Schedule chest PT, acapella, incentive spirometry  -Now on home 2.5-3 L O2. Afebrile. VSS. Leukocytosis resolved  -Repeat ABG improved

## 2019-03-24 NOTE — SUBJECTIVE & OBJECTIVE
Interval History  Oriented to self and place only. Follows commands. Denies any complaints.     Review of Systems   Unable to perform ROS: Dementia     Objective:     Vital Signs (Most Recent):  Temp: 97.2 °F (36.2 °C) (03/24/19 1153)  Pulse: 80 (03/24/19 1204)  Resp: (!) 24 (03/24/19 1204)  BP: (!) 144/69 (03/24/19 1153)  SpO2: 99 % (03/24/19 1204) Vital Signs (24h Range):  Temp:  [97.2 °F (36.2 °C)-98.8 °F (37.1 °C)] 97.2 °F (36.2 °C)  Pulse:  [] 80  Resp:  [16-28] 24  SpO2:  [92 %-99 %] 99 %  BP: (136-145)/(69-79) 144/69     Weight: 82.3 kg (181 lb 7 oz)  Body mass index is 29.28 kg/m².    Physical Exam   Constitutional: No distress.   HENT:   Head: Normocephalic and atraumatic.   Right Ear: External ear normal.   Left Ear: External ear normal.   Eyes: Conjunctivae and EOM are normal. Right eye exhibits no discharge. Left eye exhibits no discharge.   Neck: Normal range of motion. Neck supple.   Cardiovascular: Normal rate, regular rhythm and intact distal pulses. Exam reveals no decreased pulses.   Pulmonary/Chest: No accessory muscle usage. No respiratory distress. She has no decreased breath sounds. She has wheezes.   +upper airway noise   Abdominal: Soft. She exhibits no distension. There is no tenderness. There is no guarding.   Musculoskeletal: She exhibits no edema or tenderness.   Neurological: She is alert. No sensory deficit.   confused   Skin: Skin is warm and dry. Capillary refill takes 2 to 3 seconds. She is not diaphoretic.   Psychiatric: She is not agitated. Cognition and memory are normal. Cognition and memory are not impaired.         CRANIAL NERVES     CN III, IV, VI   Extraocular motions are normal.        Significant Labs:   CBC:   Recent Labs   Lab 03/23/19  0725 03/24/19  0743   WBC 10.18 9.55   HGB 11.7* 11.0*   HCT 37.2 36.7*    162     CMP:   Recent Labs   Lab 03/22/19  1501 03/23/19  0725 03/24/19  0743    138 138   K 5.1 3.3* 3.8   CL 87* 90* 93*   CO2 44* 41* 34*    * 82 91   BUN 19 18 19   CREATININE 0.8 0.7 0.6   CALCIUM 8.8 8.5* 8.5*   PROT  --  5.3* 5.4*   ALBUMIN  --  2.6* 2.7*   BILITOT  --  0.9 1.0   ALKPHOS  --  66 64   AST  --  20 20   ALT  --  10 13   ANIONGAP 6* 7* 11   EGFRNONAA >60.0 >60.0 >60.0       Significant Imaging: I have reviewed all pertinent imaging results/findings within the past 24 hours.

## 2019-03-24 NOTE — PLAN OF CARE
Problem: Adult Inpatient Plan of Care  Goal: Plan of Care Review  Outcome: Ongoing (interventions implemented as appropriate)  Pt free of fall this shift. Pt denied pain. Cardiac monitor in place and no abnormal rhythm noted. No s/s of infection noted. No skin breakdown noted. Pt was noted more confused today during this shift than before AM to PM shift change yesterday; MD notified. Pt afebrile. More respiratory distress noted. Vs is being monitor. Will continue to monitor pt.

## 2019-03-24 NOTE — PROGRESS NOTES
Nurse assessed pt and noted pt is more confuse than the evening before shift change yesterday. Dr. Caro notified and Dr. Caro later cancelled the pt discharge order and ordered some lab work for pt.

## 2019-03-24 NOTE — ASSESSMENT & PLAN NOTE
-See respiratory failure  -Hold home metoprolol and lisinopril, if stable and improving in AM can consider restarting  -Repeat BNP mildly elevated. Give one dose of IV Lasix today.

## 2019-03-25 VITALS
SYSTOLIC BLOOD PRESSURE: 138 MMHG | OXYGEN SATURATION: 95 % | TEMPERATURE: 98 F | WEIGHT: 189.63 LBS | DIASTOLIC BLOOD PRESSURE: 66 MMHG | RESPIRATION RATE: 18 BRPM | BODY MASS INDEX: 30.47 KG/M2 | HEIGHT: 66 IN | HEART RATE: 87 BPM

## 2019-03-25 LAB
ALBUMIN SERPL BCP-MCNC: 2.6 G/DL (ref 3.5–5.2)
ALP SERPL-CCNC: 61 U/L (ref 55–135)
ALT SERPL W/O P-5'-P-CCNC: 12 U/L (ref 10–44)
ANION GAP SERPL CALC-SCNC: 8 MMOL/L (ref 8–16)
AST SERPL-CCNC: 18 U/L (ref 10–40)
BASOPHILS # BLD AUTO: 0.01 K/UL (ref 0–0.2)
BASOPHILS NFR BLD: 0.1 % (ref 0–1.9)
BILIRUB SERPL-MCNC: 1.1 MG/DL (ref 0.1–1)
BUN SERPL-MCNC: 21 MG/DL (ref 8–23)
CALCIUM SERPL-MCNC: 8.3 MG/DL (ref 8.7–10.5)
CHLORIDE SERPL-SCNC: 93 MMOL/L (ref 95–110)
CO2 SERPL-SCNC: 38 MMOL/L (ref 23–29)
CREAT SERPL-MCNC: 0.6 MG/DL (ref 0.5–1.4)
DIFFERENTIAL METHOD: ABNORMAL
EOSINOPHIL # BLD AUTO: 0.2 K/UL (ref 0–0.5)
EOSINOPHIL NFR BLD: 2.2 % (ref 0–8)
ERYTHROCYTE [DISTWIDTH] IN BLOOD BY AUTOMATED COUNT: 15.7 % (ref 11.5–14.5)
EST. GFR  (AFRICAN AMERICAN): >60 ML/MIN/1.73 M^2
EST. GFR  (NON AFRICAN AMERICAN): >60 ML/MIN/1.73 M^2
GLUCOSE SERPL-MCNC: 100 MG/DL (ref 70–110)
HCT VFR BLD AUTO: 38.5 % (ref 37–48.5)
HGB BLD-MCNC: 11.4 G/DL (ref 12–16)
IMM GRANULOCYTES # BLD AUTO: 0.06 K/UL (ref 0–0.04)
IMM GRANULOCYTES NFR BLD AUTO: 0.6 % (ref 0–0.5)
LYMPHOCYTES # BLD AUTO: 0.6 K/UL (ref 1–4.8)
LYMPHOCYTES NFR BLD: 5.9 % (ref 18–48)
MAGNESIUM SERPL-MCNC: 2 MG/DL (ref 1.6–2.6)
MCH RBC QN AUTO: 26 PG (ref 27–31)
MCHC RBC AUTO-ENTMCNC: 29.6 G/DL (ref 32–36)
MCV RBC AUTO: 88 FL (ref 82–98)
MONOCYTES # BLD AUTO: 0.5 K/UL (ref 0.3–1)
MONOCYTES NFR BLD: 5 % (ref 4–15)
NEUTROPHILS # BLD AUTO: 8.3 K/UL (ref 1.8–7.7)
NEUTROPHILS NFR BLD: 86.2 % (ref 38–73)
NRBC BLD-RTO: 0 /100 WBC
PLATELET # BLD AUTO: 158 K/UL (ref 150–350)
PMV BLD AUTO: 11.1 FL (ref 9.2–12.9)
POTASSIUM SERPL-SCNC: 3.4 MMOL/L (ref 3.5–5.1)
PROT SERPL-MCNC: 5.2 G/DL (ref 6–8.4)
RBC # BLD AUTO: 4.38 M/UL (ref 4–5.4)
SODIUM SERPL-SCNC: 139 MMOL/L (ref 136–145)
WBC # BLD AUTO: 9.6 K/UL (ref 3.9–12.7)

## 2019-03-25 PROCEDURE — 25000242 PHARM REV CODE 250 ALT 637 W/ HCPCS: Performed by: HOSPITALIST

## 2019-03-25 PROCEDURE — 27000221 HC OXYGEN, UP TO 24 HOURS

## 2019-03-25 PROCEDURE — 94664 DEMO&/EVAL PT USE INHALER: CPT

## 2019-03-25 PROCEDURE — 94799 UNLISTED PULMONARY SVC/PX: CPT

## 2019-03-25 PROCEDURE — 80053 COMPREHEN METABOLIC PANEL: CPT

## 2019-03-25 PROCEDURE — 94640 AIRWAY INHALATION TREATMENT: CPT

## 2019-03-25 PROCEDURE — 99238 PR HOSPITAL DISCHARGE DAY,<30 MIN: ICD-10-PCS | Mod: ,,, | Performed by: HOSPITALIST

## 2019-03-25 PROCEDURE — 36415 COLL VENOUS BLD VENIPUNCTURE: CPT

## 2019-03-25 PROCEDURE — 94761 N-INVAS EAR/PLS OXIMETRY MLT: CPT

## 2019-03-25 PROCEDURE — 25000003 PHARM REV CODE 250: Performed by: HOSPITALIST

## 2019-03-25 PROCEDURE — 99238 HOSP IP/OBS DSCHRG MGMT 30/<: CPT | Mod: ,,, | Performed by: HOSPITALIST

## 2019-03-25 PROCEDURE — 85025 COMPLETE CBC W/AUTO DIFF WBC: CPT

## 2019-03-25 PROCEDURE — 99900035 HC TECH TIME PER 15 MIN (STAT)

## 2019-03-25 PROCEDURE — 94668 MNPJ CHEST WALL SBSQ: CPT

## 2019-03-25 PROCEDURE — 83735 ASSAY OF MAGNESIUM: CPT

## 2019-03-25 RX ORDER — FUROSEMIDE 20 MG/1
40 TABLET ORAL DAILY
Qty: 30 TABLET | Refills: 11
Start: 2019-03-25 | End: 2020-03-24

## 2019-03-25 RX ADMIN — ASPIRIN 81 MG: 81 TABLET, COATED ORAL at 09:03

## 2019-03-25 RX ADMIN — GUAIFENESIN 400 MG: 200 SOLUTION ORAL at 01:03

## 2019-03-25 RX ADMIN — ESCITALOPRAM OXALATE 20 MG: 5 TABLET, FILM COATED ORAL at 09:03

## 2019-03-25 RX ADMIN — LISINOPRIL 20 MG: 20 TABLET ORAL at 09:03

## 2019-03-25 RX ADMIN — IPRATROPIUM BROMIDE AND ALBUTEROL SULFATE 3 ML: .5; 3 SOLUTION RESPIRATORY (INHALATION) at 09:03

## 2019-03-25 RX ADMIN — IPRATROPIUM BROMIDE AND ALBUTEROL SULFATE 3 ML: .5; 3 SOLUTION RESPIRATORY (INHALATION) at 04:03

## 2019-03-25 RX ADMIN — IPRATROPIUM BROMIDE AND ALBUTEROL SULFATE 3 ML: .5; 3 SOLUTION RESPIRATORY (INHALATION) at 01:03

## 2019-03-25 RX ADMIN — POTASSIUM CHLORIDE 20 MEQ: 20 SOLUTION ORAL at 09:03

## 2019-03-25 RX ADMIN — FLUTICASONE FUROATE AND VILANTEROL TRIFENATATE 1 PUFF: 100; 25 POWDER RESPIRATORY (INHALATION) at 09:03

## 2019-03-25 RX ADMIN — GUAIFENESIN 400 MG: 200 SOLUTION ORAL at 05:03

## 2019-03-25 RX ADMIN — GUAIFENESIN 400 MG: 200 SOLUTION ORAL at 09:03

## 2019-03-25 RX ADMIN — IPRATROPIUM BROMIDE AND ALBUTEROL SULFATE 3 ML: .5; 3 SOLUTION RESPIRATORY (INHALATION) at 12:03

## 2019-03-25 NOTE — PLAN OF CARE
Problem: Adult Inpatient Plan of Care  Goal: Plan of Care Review  Outcome: Ongoing (interventions implemented as appropriate)  No acute events throughout shift bed in low position and locked..  Vitals and assessment completed as ordered. Pt calm and cooperative. No complains of pain. Pt free from injury or falls

## 2019-03-25 NOTE — DISCHARGE SUMMARY
[facility] Hospital Medicine  Discharge Summary      Patient Name: Laura Negron  MRN: [id number]  Admission Date: 3/18/2019  Hospital Length of Stay: 7 days  Discharge Date and Time: No discharge date for patient encounter.  Attending Physician: Mohini Caro MD   Discharging Provider: Mohini Caro MD  Primary Care Provider: Jorge Hair MD  [facility] Hospital Medicine Team: [facility] HOSP MED C Mohini Caro MD    HPI:   79 y/o with COPD on 2L home O2, HFpEF, HTN, dementia (baseline waxing and waning orientation to family and place, never year), DM2 who presents from [facility] where she has been since 2015 with chief complaint of dyspnea. Per her daughter at bedside who provides history given respiratory distress of patient she had recent URI sx's for the past few weeks for which she presented to [facility] 03/13-03/16 and admitted to ICU for respiratory failure thought to be 2/2 COPD exacerbation from influenza A. She was discharged on oral steroids and tamiflu. Per family she again worsened at [facility] for past 2 days and presented to ER there and although notes are not complete was given albuterol and had a chest x-ray showing a right perihilar opacity cannot exclude underlying mass lesion recommend further evaluation with chest CT. This is chronic and has been read as fullness previously. Also showed interstitial pulmonary vascular congestion. Her daughter reports [place] called her the next day and reported she continued to decline so was transferred here for management. Her daughter also reports patient has progressively declined, now completely wheelchair bound and 100% dependent with all of her ADLs, additionally recently noted to have gurgling / choking with po intake. Her daughter is unaware of her medication list.     Initially put on lasix gtt with plans for CCU admission but GOC discussion with CCU fellow was notable for her stated she would not want any  aggressive measures for her mother, she would not want any chest compression, intubation, DCCV, pressor support. However would further like to discuss with her siblings ( 2x brothers / 1x sister) prior to making decision. I had a similar discussion where she states she does not think her mother would want that based on previous things she has said but had never explicitly said it so she wanted to discuss with family.     * No surgery found *      Hospital Course:   Acute on chronic respiratory failure with hypoxia and hypercapnia  Acute COPD exacerbation  Acute on chronic diastolic heart failure exacerbation  Influenza infection  Pneumonia 2/2 Moraxella catarrhalis  -Likely due to a combination of COPD exacerbation from influenza, bacterial pna, and CHF exacerbation  -Initially in some respiratory distress and requiring pressure support from BiPAP. She was remarkably well compensated on ABG though breathing 30-35/min. Baseline CO2 appears to be mid 30s  -Repeat flu test negative  -Diuresed well with Lasix gtt which was discontinued due to contraction alkalosis.  -Vancomycin and Cefepime initiated empirically; respiratory culture + for Moraxella, blood cultures negative, procal negative. Deescalated antibiotics to Levaquin to complete 5 day course  -Completed 5 day Tamiflu course that was started prior to admission  -Solumedrol 60mg IV BID transitioned to prednisone 40 mg PO daily; she completed a course of this.   -Now on home 2.5-3 L O2. Afebrile. VSS. Leukocytosis resolved  -Repeat ABG improved     Hypokalemia  Resume home KCl 20 meq daily.  Monitor Mg.         Alkalosis, metabolic  Baseline in 30's likely 2/2 chronic respiratory acidosis from obstructive lung disease.   Elevated 2/2 diuresis.  Improved with IVF's.         Encephalopathy, metabolic  -Acute on chronic due to infections vs respiratory distress  -Does have waxing/waning mental status at baseline.  -Mental status has returned to baseline. Pt alert &  oriented to person, place, and time at time of d/c.         Goals of care, counseling/discussion  -After discussion with patient and family, she has decided to be DNR.        Demand ischemia  -2/2 CHF exacerbation vs infection   -Trop down-trended        Acute on chronic diastolic congestive heart failure  -See respiratory failure  -Continue BP control]  -Increase home Lasix to 40 mg daily       Influenza A  -See respiratory failure        DM2 (diabetes mellitus, type 2)  -A1C 5.         Major depression, single episode  -Continue home SSRI, hold benzo given respiratory distress        Chronic obstructive pulmonary disease with acute exacerbation  -See respiratory failure        Essential hypertension  -Continue home meds         Consults:       Final Active Diagnoses:    Diagnosis Date Noted POA    PRINCIPAL PROBLEM:  Acute on chronic respiratory failure with hypoxia and hypercapnia [J96.21, J96.22] 03/19/2019 Yes    Hypokalemia [E87.6] 03/20/2019 No    Alkalosis, metabolic [E87.3] 03/20/2019 Yes    Encephalopathy, metabolic [G93.41] 03/19/2019 Yes    Goals of care, counseling/discussion [Z71.89] 03/19/2019 Not Applicable    Acute on chronic diastolic congestive heart failure [I50.33] 03/18/2019 Yes    Demand ischemia [I24.8] 03/18/2019 Yes    Influenza A [J10.1] 03/13/2019 Yes    Chronic obstructive pulmonary disease with acute exacerbation [J44.1]  Yes    Major depression, single episode [F32.9]  Yes    DM2 (diabetes mellitus, type 2) [E11.9]  Yes    Essential hypertension [I10] 11/05/2016 Yes     Chronic      Problems Resolved During this Admission:       Discharged Condition: stable    Disposition: care home Nursing Home    Follow Up:  Follow-up Information     Schedule an appointment as soon as possible for a visit with Jorge Hair MD.    Specialty:  Internal Medicine  Contact information:  [facility] [place] [place] [id number]  [place] [id number] [phone]                 Patient Instructions:       Diet Cardiac     Notify your health care provider if you experience any of the following:  temperature >100.4     Notify your health care provider if you experience any of the following:  persistent nausea and vomiting or diarrhea     Notify your health care provider if you experience any of the following:  severe uncontrolled pain     Notify your health care provider if you experience any of the following:  difficulty breathing or increased cough     Notify your health care provider if you experience any of the following:  severe persistent headache     Notify your health care provider if you experience any of the following:  worsening rash     Notify your health care provider if you experience any of the following:  persistent dizziness, light-headedness, or visual disturbances     Notify your health care provider if you experience any of the following:  increased confusion or weakness     Activity as tolerated       Significant Diagnostic Studies: Labs:   CMP   Recent Labs   Lab 03/24/19  0743 03/25/19  0654    139   K 3.8 3.4*   CL 93* 93*   CO2 34* 38*   GLU 91 100   BUN 19 21   CREATININE 0.6 0.6   CALCIUM 8.5* 8.3*   PROT 5.4* 5.2*   ALBUMIN 2.7* 2.6*   BILITOT 1.0 1.1*   ALKPHOS 64 61   AST 20 18   ALT 13 12   ANIONGAP 11 8   ESTGFRAFRICA >60.0 >60.0   EGFRNONAA >60.0 >60.0    and CBC   Recent Labs   Lab 03/24/19  0743 03/25/19  0654   WBC 9.55 9.60   HGB 11.0* 11.4*   HCT 36.7* 38.5    158       Pending Diagnostic Studies:     None         Medications:  Reconciled Home Medications:      Medication List      CHANGE how you take these medications    furosemide 20 MG tablet  Commonly known as:  LASIX  Take 2 tablets (40 mg total) by mouth once daily.  What changed:  how much to take        CONTINUE taking these medications    acetaminophen 500 MG tablet  Commonly known as:  TYLENOL  Take 500 mg by mouth every 4 (four) hours as needed for Pain.     albuterol-ipratropium 2.5 mg-0.5 mg/3 mL  nebulizer solution  Commonly known as:  DUO-NEB  Take 3 mLs by nebulization every 6 (six) hours as needed for Wheezing.     aspirin 81 MG EC tablet  Commonly known as:  ECOTRIN  Take 81 mg by mouth once daily.     dextran 70-hypromellose ophthalmic solution  Commonly known as:  TEARS  Place 2 drops into both eyes 2 (two) times daily.     docusate sodium 100 MG capsule  Commonly known as:  COLACE  Take 1 capsule (100 mg total) by mouth 2 (two) times daily.     escitalopram oxalate 20 MG tablet  Commonly known as:  LEXAPRO  Take 20 mg by mouth once daily.     fluticasone-salmeterol 250-50 mcg/dose 250-50 mcg/dose diskus inhaler  Commonly known as:  ADVAIR  Inhale 1 puff into the lungs 2 (two) times daily.     lisinopril 20 MG tablet  Commonly known as:  PRINIVIL,ZESTRIL  Take 1 tablet (20 mg total) by mouth once daily.     loratadine 10 mg tablet  Commonly known as:  CLARITIN  Take 10 mg by mouth once daily.     meclizine 32 MG tablet  Commonly known as:  ANTIVERT  Take 25 mg by mouth 3 (three) times daily as needed.     melatonin 3 mg Tbsr  Take 2 tablets by mouth every evening.     metoprolol succinate 25 MG 24 hr tablet  Commonly known as:  TOPROL-XL  Take 25 mg by mouth every evening.     mirabegron 25 mg Tb24 ER tablet  Commonly known as:  MYRBETRIQ  Take 1 tablet (25 mg total) by mouth once daily.     multivitamin capsule  Take 1 capsule by mouth once daily.     polyethylene glycol 17 gram Pwpk  Commonly known as:  GLYCOLAX  Take 17 g by mouth once daily at 6am.     potassium chloride 20 mEq Pack  Commonly known as:  KLOR-CON  Take 20 mEq by mouth once daily.     tiotropium 18 mcg inhalation capsule  Commonly known as:  SPIRIVA  Inhale 18 mcg into the lungs once daily.        STOP taking these medications    clonazePAM 0.5 MG tablet  Commonly known as:  KLONOPIN     guaiFENesin 600 mg 12 hr tablet  Commonly known as:  MUCINEX     oseltamivir 75 MG capsule  Commonly known as:  TAMIFLU     predniSONE 10 MG  tablet  Commonly known as:  DELTASONE            Indwelling Lines/Drains at time of discharge:   Lines/Drains/Airways     Drain            Female External Urinary Catheter 03/18/19 7 days                Time spent on the discharge of patient: 29 minutes  Patient was seen and examined on the date of discharge and determined to be suitable for discharge.         Mohini Caro MD  Department of Hospital Medicine  [facility]

## 2019-03-25 NOTE — PLAN OF CARE
Pt will return to [facility] today.  They will send nursing home van to  pt.  Floor nurse will call report to [phone].     03/25/19 1253   Post-Acute Status   Post-Acute Authorization Placement   Post-Acute Placement Status Referrals Sent

## 2019-03-25 NOTE — PHYSICIAN QUERY
PT Name: Laura Negron  MR #: [id number]    Physician Query Form - Documentation Clarification      CDS/: Mine Amaya RN CDI             Contact information: [email or web address]    This form is a permanent document in the medical record.     Query Date: March 25, 2019    By submitting this query, we are merely seeking further clarification of documentation. Please utilize your independent clinical judgment when addressing the question(s) below.    The Medical record contains the following:  Supporting Clinical Findings   Location in record   presented to [facility] 03/13-03/16 and admitted to ICU  for respiratory failure,   thought to be 2/2 COPD exacerbation from influenza A.     She was discharged on oral steroids and tamiflu. Per family she again worsened at nursing center for past 2 days and presented to ER                                                                                                                                                        3/19/2019 07:59  Flu A & B Source: Nasal swab  Influenza A, Molecular: Negative  Influenza B, Molecular: Negative                  Hospital medicine H&P  3/19 1254 am            Lab results       Respiratory culture 3/19     MORAXELLA (BRANHAMELLA) CATARRHALIS   Many   Beta Lactamase positive     Gram Stain (Respiratory) >10 epithelial cells per low power field   Gram Stain (Respiratory) Many WBC's   Gram Stain (Respiratory) Many Gram positive cocci   Gram Stain (Respiratory) Few Gram negative diplococci   Gram Stain (Respiratory) Few Gram negative rods   Gram Stain (Respiratory) Rare Gram positive rods   Gram Stain (Respiratory) Rare yeast                                                                                                                                                                                          Lab results   Pneumonia  Influenza infection    Tx.  -Initially in some respiratory distress and requiring pressure  support from BiPAP. She was remarkably well compensated on ABG though breathing 30-35/min. Baseline CO2 appears to be mid 30s  -Repeat flu test negative  -Diuresed well with Lasix gtt which was discontinued due to contraction alkalosis.  -Vancomycin and Cefepime initiated empirically; respiratory culture negative, blood cultures negative, procal negative. Deescalated antibiotics to Levaquin to complete 7 day course  -Completed 5 day Tamiflu course that was started prior to admission  -Solumedrol 60mg IV BID transitioned to prednisone 40 mg PO daily.  -Duonebs q4h + prn. Schedule chest PT, acapella, incentive spirometry  -Now on home 2.5-3 L O2. Afebrile. VSS. Leukocytosis resolved     Hospital medicine 3/24 206 pm       Doctor, Please specify diagnosis or diagnoses associated with above clinical findings.    Provider, please specify the organism/type of Pneumonia.      [  ] Bacterial Pneumonia due to Moraxella Branhamella Catarrhalis      [  ] Viral Pneumonia due to Influenza A     [ x ] Both bacterial and viral Pneumonia due to Moraxella Branhamella Catarrhalis and Influenza A.     [  ] Other Pneumonia organism/type (Please Specify): _________________________   [  ] Other (Please Specify): _________________________   [  ] Clinically Undetermined

## 2019-03-25 NOTE — HOSPITAL COURSE
Acute on chronic respiratory failure with hypoxia and hypercapnia  Acute COPD exacerbation  Acute on chronic diastolic heart failure exacerbation  Influenza infection  Pneumonia 2/2 Moraxella catarrhalis  -Likely due to a combination of COPD exacerbation from influenza, bacterial pna, and CHF exacerbation  -Initially in some respiratory distress and requiring pressure support from BiPAP. She was remarkably well compensated on ABG though breathing 30-35/min. Baseline CO2 appears to be mid 30s  -Repeat flu test negative  -Diuresed well with Lasix gtt which was discontinued due to contraction alkalosis.  -Vancomycin and Cefepime initiated empirically; respiratory culture + for Moraxella, blood cultures negative, procal negative. Deescalated antibiotics to Levaquin to complete 5 day course  -Completed 5 day Tamiflu course that was started prior to admission  -Solumedrol 60mg IV BID transitioned to prednisone 40 mg PO daily; she completed a course of this.   -Now on home 2.5-3 L O2. Afebrile. VSS. Leukocytosis resolved  -Repeat ABG improved     Hypokalemia  Resume home KCl 20 meq daily.  Monitor Mg.         Alkalosis, metabolic  Baseline in 30's likely 2/2 chronic respiratory acidosis from obstructive lung disease.   Elevated 2/2 diuresis.  Improved with IVF's.         Encephalopathy, metabolic  -Acute on chronic due to infections vs respiratory distress  -Does have waxing/waning mental status at baseline.  -Mental status has returned to baseline. Pt alert & oriented to person, place, and time at time of d/c.         Goals of care, counseling/discussion  -After discussion with patient and family, she has decided to be DNR.        Demand ischemia  -2/2 CHF exacerbation vs infection   -Trop down-trended        Acute on chronic diastolic congestive heart failure  -See respiratory failure  -Continue BP control       Influenza A  -See respiratory failure        DM2 (diabetes mellitus, type 2)  -A1C 5.         Major  depression, single episode  -Continue home SSRI, hold benzo given respiratory distress        Chronic obstructive pulmonary disease with acute exacerbation  -See respiratory failure        Essential hypertension  -Continue home meds

## 2019-03-25 NOTE — PLAN OF CARE
03/25/19 0855   Discharge Reassessment   Assessment Type Discharge Planning Reassessment   Do you have any problems affording any of your prescribed medications? TBD   Discharge Plan A Return to nursing home

## 2019-03-26 NOTE — PLAN OF CARE
03/26/19 0708   Final Note   Assessment Type Final Discharge Note   Anticipated Discharge Disposition senior living [facility]

## 2019-04-09 ENCOUNTER — OFFICE VISIT (OUTPATIENT)
Dept: UROLOGY | Facility: CLINIC | Age: 79
End: 2019-04-09
Payer: MEDICARE

## 2019-04-09 VITALS — BODY MASS INDEX: 30.37 KG/M2 | HEIGHT: 66 IN | WEIGHT: 189 LBS

## 2019-04-09 DIAGNOSIS — K59.00 CONSTIPATION, UNSPECIFIED CONSTIPATION TYPE: ICD-10-CM

## 2019-04-09 DIAGNOSIS — N39.0 RECURRENT UTI: Primary | ICD-10-CM

## 2019-04-09 DIAGNOSIS — R35.0 URINARY FREQUENCY: ICD-10-CM

## 2019-04-09 LAB
BILIRUB UR QL STRIP: NEGATIVE
CLARITY UR REFRACT.AUTO: CLEAR
COLOR UR AUTO: NORMAL
GLUCOSE UR QL STRIP: NEGATIVE
HGB UR QL STRIP: NEGATIVE
KETONES UR QL STRIP: NEGATIVE
LEUKOCYTE ESTERASE UR QL STRIP: NEGATIVE
MICROSCOPIC COMMENT: NORMAL
NITRITE UR QL STRIP: NEGATIVE
PH UR STRIP: 6 [PH] (ref 5–8)
PROT UR QL STRIP: NEGATIVE
RBC #/AREA URNS AUTO: 1 /HPF (ref 0–4)
SP GR UR STRIP: 1.01 (ref 1–1.03)
SQUAMOUS #/AREA URNS AUTO: 0 /HPF
URN SPEC COLLECT METH UR: NORMAL
WBC #/AREA URNS AUTO: 1 /HPF (ref 0–5)

## 2019-04-09 PROCEDURE — 99213 OFFICE O/P EST LOW 20 MIN: CPT | Mod: PBBFAC,PO | Performed by: UROLOGY

## 2019-04-09 PROCEDURE — 87086 URINE CULTURE/COLONY COUNT: CPT

## 2019-04-09 PROCEDURE — 99999 PR PBB SHADOW E&M-EST. PATIENT-LVL III: CPT | Mod: PBBFAC,,, | Performed by: UROLOGY

## 2019-04-09 PROCEDURE — 99214 PR OFFICE/OUTPT VISIT, EST, LEVL IV, 30-39 MIN: ICD-10-PCS | Mod: S$PBB,,, | Performed by: UROLOGY

## 2019-04-09 PROCEDURE — 81001 URINALYSIS AUTO W/SCOPE: CPT

## 2019-04-09 PROCEDURE — 99999 PR PBB SHADOW E&M-EST. PATIENT-LVL III: ICD-10-PCS | Mod: PBBFAC,,, | Performed by: UROLOGY

## 2019-04-09 PROCEDURE — 99214 OFFICE O/P EST MOD 30 MIN: CPT | Mod: S$PBB,,, | Performed by: UROLOGY

## 2019-04-09 RX ORDER — SULFAMETHOXAZOLE AND TRIMETHOPRIM 800; 160 MG/1; MG/1
1 TABLET ORAL 2 TIMES DAILY
Qty: 14 TABLET | Refills: 1 | Status: SHIPPED | OUTPATIENT
Start: 2019-04-09 | End: 2019-04-16

## 2019-04-09 RX ORDER — METHENAMINE HIPPURATE 1000 MG/1
1 TABLET ORAL 2 TIMES DAILY
Qty: 60 TABLET | Refills: 11 | Status: SHIPPED | OUTPATIENT
Start: 2019-04-09

## 2019-04-09 NOTE — PROGRESS NOTES
Subjective:       Patient ID: Laura Negron is a 78 y.o. female.    Chief Complaint: Dysuria    77 yo WF with recurrent UTI. States UTI returns every 2 months. Normal Cystoscopy January 2019. Here for evaluation and treatment of recurrent UTI    Urinary Tract Infection    This is a recurrent problem. The current episode started more than 1 month ago. The problem occurs every urination. The problem has been unchanged. The quality of the pain is described as aching and burning. The pain is at a severity of 4/10. The pain is moderate. There has been no fever. She is not sexually active. There is no history of pyelonephritis. Associated symptoms include frequency, hesitancy, urgency and constipation. Pertinent negatives include no behavior changes, chills, discharge, flank pain, hematuria, nausea, possible pregnancy, sweats, vomiting, weight loss, bubble bath use, rash or withholding. She has tried antibiotics for the symptoms. The treatment provided mild relief. Her past medical history is significant for catheterization, diabetes mellitus, hypertension and recurrent UTIs. There is no history of diabetes insipidus, genitourinary reflux, kidney stones, a single kidney, STD, urinary stasis or a urological procedure.     Review of Systems   Constitutional: Negative for activity change, appetite change, chills, fatigue, fever and weight loss.   HENT: Negative for congestion, ear pain, hearing loss, nosebleeds, sinus pressure, sore throat and trouble swallowing.    Eyes: Negative for pain and visual disturbance.   Respiratory: Positive for cough, shortness of breath and wheezing. Negative for apnea.         On Oxygen   Cardiovascular: Negative for chest pain and leg swelling.   Gastrointestinal: Positive for constipation. Negative for abdominal distention, abdominal pain, anal bleeding, blood in stool, diarrhea, nausea, rectal pain and vomiting.   Endocrine: Negative for cold intolerance, heat intolerance, polydipsia,  polyphagia and polyuria.   Genitourinary: Positive for frequency, hesitancy and urgency. Negative for decreased urine volume, difficulty urinating, dyspareunia, dysuria, enuresis, flank pain, genital sores, hematuria, menstrual problem, pelvic pain, vaginal bleeding, vaginal discharge and vaginal pain.   Musculoskeletal: Negative for arthralgias and back pain.   Skin: Negative for color change, pallor and rash.   Allergic/Immunologic: Negative for environmental allergies, food allergies and immunocompromised state.   Neurological: Positive for weakness. Negative for dizziness, speech difficulty and headaches.   Hematological: Negative for adenopathy. Does not bruise/bleed easily.   Psychiatric/Behavioral: Negative.        Objective:      Physical Exam   Nursing note and vitals reviewed.  Constitutional: She is oriented to person, place, and time. She appears well-developed and well-nourished.   W/c bound, on O2   HENT:   Head: Normocephalic.   Nose: Nose normal.   Mouth/Throat: Oropharynx is clear and moist.   Eyes: Conjunctivae and EOM are normal. Pupils are equal, round, and reactive to light.   Neck: Normal range of motion. Neck supple.   Cardiovascular: Normal rate, regular rhythm, normal heart sounds and intact distal pulses.    Pulmonary/Chest: Effort normal and breath sounds normal.   Abdominal: Soft. Bowel sounds are normal.   Genitourinary: Vagina normal.   Musculoskeletal: Normal range of motion.   Neurological: She is alert and oriented to person, place, and time. She has normal reflexes.   Skin: Skin is warm and dry.     Psychiatric: She has a normal mood and affect. Her behavior is normal. Judgment and thought content normal.       Assessment:       1. Recurrent UTI    2. Urinary frequency    3. Constipation, unspecified constipation type        Plan:       Patient Instructions   Start Bactrim twice daily for 7 days  Await U C+S  Urex 1 gram every 12 hours for UTI prevention.

## 2019-04-10 LAB — BACTERIA UR CULT: NORMAL

## 2019-04-11 ENCOUNTER — TELEPHONE (OUTPATIENT)
Dept: UROLOGY | Facility: CLINIC | Age: 79
End: 2019-04-11

## 2019-04-11 NOTE — TELEPHONE ENCOUNTER
----- Message from Antonio Rizo MD sent at 4/11/2019  1:42 PM CDT -----  Urine culture is negative

## 2022-11-24 NOTE — ASSESSMENT & PLAN NOTE
2/16/18 HgA1c 5.1  She is only on sliding scale insulin at nursing home, Check blood glucose AC and HS and use SSI. Diabetic diet, when cleared for Diet..        
2/16/18 HgA1c 5.1. .  She is only on sliding scale insulin at nursing home, Check blood glucose AC and HS and use SSI. Diabetic diet, when cleared for Diet..        
2/16/18 HgA1c 5.1. .  She is only on sliding scale insulin at nursing home, Check blood glucose AC and HS and use SSI. Diabetic diet.      
Anxiety  Calm and pleasant on exam.   Continue home escitalopram 20 mg daily and clonazepam 0.5 mg BID prn    
Anxiety  Calm and pleasant on exam.   Continue home escitalopram 20 mg daily and clonazepam 0.5 mg BID prn    
Anxiety  Continue home escitalopram 20 mg daily and clonazepam 0.5 mg BID prn    
Essential Hypertension  3/20/17 Echo shows EF 55% with diastolic dysfunction, Mild MVR & TVR, PA 41.64  Stable chest x-ray. Does not appear to be clinically volume overloaded.   --Holding home lasix 20 mg daily. Monitor Closely  --Continue home aspirin 81 mg daily, valsartan 160 mg daily and metoprolol XL 25 mg daily      
Essential Hypertension  3/20/17 Echo shows EF 55% with diastolic dysfunction, Mild MVR & TVR, PA 41.64  Stable chest x-ray. Does not appear to be clinically volume overloaded. -122.   --Holding home lasix 20 mg daily. Monitor Closely  --Continue home aspirin 81 mg daily, valsartan 160 mg daily and metoprolol XL 25 mg daily      
Essential Hypertension  3/20/17 Echo shows EF 55% with diastolic dysfunction, Mild MVR & TVR, PA 41.64  Stable chest x-ray. Does not appear to be clinically volume overloaded. -143.   Resume home lasix 20 mg daily. Monitor Closely  Continue home aspirin 81 mg daily, valsartan 160 mg daily and metoprolol XL 25 mg daily      
On Home Oxygen (2.5 LPM)  --Continue home oxygen  --Scheduled Breathing treatments as patient has dyspnea on exam, no wheexing  --Continue home fluticisone-salmeterol and loratadine 10 mg daily    
On Home Oxygen (2.5 LPM)  No wheezing or SOB.   Duonebs PRN, continue home fluticasone salmeterol and loratadine      
On Home Oxygen (2.5 LPM)  Rare wheezes on exam, no SOB  --Continue home oxygen  --Scheduled Breathing treatments as patient had dyspnea on exam  --Continue home fluticisone-salmeterol and loratadine 10 mg daily    
Patient with several days of nausea, vomiting, and right lower quadrant tenderness on palpation and decreased mental status. CT shows cholecystitis with stone on gallbladder neck. No duct dilation. Normal bilirubin, normal liver enzymes, normal wbc, and normal lactic acid.    --Surgery, Dr. Castañeda consulted and requesting IR consult for Cholecystotomy Drain  --Ceftriaxone and metronidazole  --NPO  --Gentle IV fluids    
Patient with several days of nausea, vomiting, and right lower quadrant tenderness on palpation and decreased mental status. CT shows cholecystitis with stone on gallbladder neck. No duct dilation. Normal bilirubin, normal liver enzymes, normal wbc, and normal lactic acid.  No complaints today, WBC 12.6, afebrile.  --Surgery, Dr. Castañeda consulted and requesting IR consult for Cholecystotomy Drain  --Ceftriaxone and metronidazole  --NPO  --Gentle IV fluids    
Patient with several days of nausea, vomiting, and right lower quadrant tenderness on palpation and decreased mental status. CT shows cholecystitis with stone on gallbladder neck. No duct dilation. Normal bilirubin, normal liver enzymes, normal wbc, and normal lactic acid.  No complaints today, WBC 12.6, afebrile.  Will pursue conservative treatment with ceftriaxone and metronidazole. Advance diet as tolerated.   
Toxic.    Resolved.  
Toxic.  Resolving. Avasys at bedside.    
Toxic.  Resolving. Sitter PRN    
Urinalysis with WBC > 100, moderate bacterial, 2+ Leukocytes. She was being treated with Ciprofloxacin for a UTI at the nursing home.   Continue cipro at d/c. F/U cultures.   
Urinalysis with WBC > 100, moderate bacterial, 2+ Leukocytes. She was being treated with Ciprofloxacin for a UTI at the nursing home.   Will be covered with Ceftriaxone. Follow Urine Culture  
Urinalysis with WBC > 100, moderate bacterial, 2+ Leukocytes. She was being treated with Ciprofloxacin for a UTI at the nursing home. Will be covered with Ceftriaxone. Follow Urine Culture    
(1) More than 48 hours/None